# Patient Record
Sex: FEMALE | Race: WHITE | Employment: FULL TIME | URBAN - METROPOLITAN AREA
[De-identification: names, ages, dates, MRNs, and addresses within clinical notes are randomized per-mention and may not be internally consistent; named-entity substitution may affect disease eponyms.]

---

## 2019-03-20 ENCOUNTER — HOSPITAL ENCOUNTER (EMERGENCY)
Age: 24
Discharge: HOME | End: 2019-03-20
Attending: EMERGENCY MEDICINE
Payer: COMMERCIAL

## 2019-03-20 VITALS
TEMPERATURE: 98.6 F | HEART RATE: 77 BPM | DIASTOLIC BLOOD PRESSURE: 83 MMHG | OXYGEN SATURATION: 99 % | SYSTOLIC BLOOD PRESSURE: 120 MMHG | RESPIRATION RATE: 14 BRPM

## 2019-03-20 DIAGNOSIS — T14.8XXA ANIMAL BITE: ICD-10-CM

## 2019-03-20 DIAGNOSIS — S01.81XA FACIAL LACERATION, INITIAL ENCOUNTER: Primary | ICD-10-CM

## 2019-03-20 PROCEDURE — 77030018836 HC SOL IRR NACL ICUM -A

## 2019-03-20 PROCEDURE — 77030002888 HC SUT CHRMC J&J -A

## 2019-03-20 PROCEDURE — 75810000293 HC SIMP/SUPERF WND  RPR

## 2019-03-20 PROCEDURE — 74011000250 HC RX REV CODE- 250: Performed by: EMERGENCY MEDICINE

## 2019-03-20 PROCEDURE — 74011250637 HC RX REV CODE- 250/637: Performed by: EMERGENCY MEDICINE

## 2019-03-20 PROCEDURE — 99283 EMERGENCY DEPT VISIT LOW MDM: CPT

## 2019-03-20 RX ORDER — AMOXICILLIN AND CLAVULANATE POTASSIUM 875; 125 MG/1; MG/1
1 TABLET, FILM COATED ORAL
Status: COMPLETED | OUTPATIENT
Start: 2019-03-20 | End: 2019-03-20

## 2019-03-20 RX ORDER — AMOXICILLIN AND CLAVULANATE POTASSIUM 875; 125 MG/1; MG/1
1 TABLET, FILM COATED ORAL 2 TIMES DAILY
Qty: 14 TAB | Refills: 0 | Status: SHIPPED | OUTPATIENT
Start: 2019-03-20 | End: 2019-03-27

## 2019-03-20 RX ORDER — LIDOCAINE HYDROCHLORIDE AND EPINEPHRINE 10; 10 MG/ML; UG/ML
1.5 INJECTION, SOLUTION INFILTRATION; PERINEURAL ONCE
Status: DISCONTINUED | OUTPATIENT
Start: 2019-03-20 | End: 2019-03-20 | Stop reason: HOSPADM

## 2019-03-20 RX ADMIN — AMOXICILLIN AND CLAVULANATE POTASSIUM 1 TABLET: 875; 125 TABLET, FILM COATED ORAL at 17:42

## 2019-03-20 RX ADMIN — Medication 2 ML: at 16:51

## 2019-03-20 NOTE — ED TRIAGE NOTES
Patient presents from home with complaints of being scratched by a dog around 1430 this afternoon. Patient arrives with steri strip applied to face

## 2019-03-20 NOTE — DISCHARGE INSTRUCTIONS
Patient Education   Patient Education   SUTURES WILL DISSOLVE     Animal Bites: Care Instructions  Your Care Instructions  After an animal bite, the biggest concern is infection. The chance of infection depends on the type of animal that bit you, where on your body you were bitten, and your general health. Many animal bites are not closed with stitches, because this can increase the chance of infection. Your bite may take as little as 7 days or as long as several months to heal, depending on how bad it is. Taking good care of your wound at home will help it heal and reduce your chance of infection. The doctor has checked you carefully, but problems can develop later. If you notice any problems or new symptoms, get medical treatment right away. Follow-up care is a key part of your treatment and safety. Be sure to make and go to all appointments, and call your doctor if you are having problems. It's also a good idea to know your test results and keep a list of the medicines you take. How can you care for yourself at home? · If your doctor told you how to care for your wound, follow your doctor's instructions. If you did not get instructions, follow this general advice:  ? After 24 to 48 hours, gently wash the wound with clean water 2 times a day. Do not scrub or soak the wound. Don't use hydrogen peroxide or alcohol, which can slow healing. ? You may cover the wound with a thin layer of petroleum jelly, such as Vaseline, and a nonstick bandage. ? Apply more petroleum jelly and replace the bandage as needed. · After you shower, gently dry the wound with a clean towel. · If your doctor has closed the wound, cover the bandage with a plastic bag before you take a shower. · A small amount of skin redness and swelling around the wound edges and the stitches or staples is normal. Your wound may itch or feel irritated. Do not scratch or rub the wound.   · Ask your doctor if you can take an over-the-counter pain medicine, such as acetaminophen (Tylenol), ibuprofen (Advil, Motrin), or naproxen (Aleve). Read and follow all instructions on the label. · Do not take two or more pain medicines at the same time unless the doctor told you to. Many pain medicines have acetaminophen, which is Tylenol. Too much acetaminophen (Tylenol) can be harmful. · If your bite puts you at risk for rabies, you will get a series of shots over the next few weeks to prevent rabies. Your doctor will tell you when to get the shots. It is very important that you get the full cycle of shots. Follow your doctor's instructions exactly. · You may need a tetanus shot if you have not received one in the last 5 years. · If your doctor prescribed antibiotics, take them as directed. Do not stop taking them just because you feel better. You need to take the full course of antibiotics. When should you call for help? Call your doctor now or seek immediate medical care if:    · The skin near the bite turns cold or pale or it changes color.     · You lose feeling in the area near the bite, or it feels numb or tingly.     · You have trouble moving a limb near the bite.     · You have symptoms of infection, such as:  ? Increased pain, swelling, warmth, or redness near the wound. ? Red streaks leading from the wound. ? Pus draining from the wound. ? A fever.     · Blood soaks through the bandage. Oozing small amounts of blood is normal.     · Your pain is getting worse.    Watch closely for changes in your health, and be sure to contact your doctor if you are not getting better as expected. Where can you learn more? Go to http://andrés-felix.info/. Enter Y674 in the search box to learn more about \"Animal Bites: Care Instructions. \"  Current as of: September 23, 2018  Content Version: 11.9  © 3338-2046 [employer].  Care instructions adapted under license by [employer] (which disclaims liability or warranty for this information). If you have questions about a medical condition or this instruction, always ask your healthcare professional. [address] any warranty or liability for your use of this information. Cuts: Care Instructions  Your Care Instructions  A cut can happen anywhere on your body. Stitches, staples, skin adhesives, or pieces of tape called Steri-Strips are sometimes used to keep the edges of a cut together and help it heal. Steri-Strips can be used by themselves or with stitches or staples. Sometimes cuts are left open. If the cut went deep and through the skin, the doctor may have closed the cut in two layers. A deeper layer of stitches brings the deep part of the cut together. These stitches will dissolve and don't need to be removed. The upper layer closure, which could be stitches, staples, Steri-Strips, or adhesive, is what you see on the cut. A cut is often covered by a bandage. The doctor has checked you carefully, but problems can develop later. If you notice any problems or new symptoms, get medical treatment right away. Follow-up care is a key part of your treatment and safety. Be sure to make and go to all appointments, and call your doctor if you are having problems. It's also a good idea to know your test results and keep a list of the medicines you take. How can you care for yourself at home? If a cut is open or closed  · Prop up the sore area on a pillow anytime you sit or lie down during the next 3 days. Try to keep it above the level of your heart. This will help reduce swelling. · Keep the cut dry for the first 24 to 48 hours. After this, you can shower if your doctor okays it. Pat the cut dry. · Don't soak the cut, such as in a bathtub. Your doctor will tell you when it's safe to get the cut wet. · After the first 24 to 48 hours, clean the cut with soap and water 2 times a day unless your doctor gives you different instructions.   ? Don't use hydrogen peroxide or alcohol, which can slow healing. ? You may cover the cut with a thin layer of petroleum jelly and a nonstick bandage. ? If the doctor put a bandage over the cut, put on a new bandage after cleaning the cut or if the bandage gets wet or dirty. · Avoid any activity that could cause your cut to reopen. · Be safe with medicines. Read and follow all instructions on the label. ? If the doctor gave you a prescription medicine for pain, take it as prescribed. ? If you are not taking a prescription pain medicine, ask your doctor if you can take an over-the-counter medicine. If the cut is closed with stitches, staples, or Steri-Strips  · Follow the above instructions for open or closed cuts. · Do not remove the stitches or staples on your own. Your doctor will tell you when to come back to have the stitches or staples removed. · Leave Steri-Strips on until they fall off. If the cut is closed with a skin adhesive  · Follow the above instructions for open or closed cuts. · Leave the skin adhesive on your skin until it falls off on its own. This may take 5 to 10 days. · Do not scratch, rub, or pick at the adhesive. · Do not put the sticky part of a bandage directly on the adhesive. · Do not put any kind of ointment, cream, or lotion over the area. This can make the adhesive fall off too soon. Do not use hydrogen peroxide or alcohol, which can slow healing. When should you call for help? Call your doctor now or seek immediate medical care if:    · You have new pain, or your pain gets worse.     · The skin near the cut is cold or pale or changes color.     · You have tingling, weakness, or numbness near the cut.     · The cut starts to bleed, and blood soaks through the bandage. Oozing small amounts of blood is normal.     · You have trouble moving the area near the cut.     · You have symptoms of infection, such as:  ? Increased pain, swelling, warmth, or redness around the cut.  ?  Red streaks leading from the cut.  ? Pus draining from the cut.  ? A fever.    Watch closely for changes in your health, and be sure to contact your doctor if:    · The cut reopens.     · You do not get better as expected. Where can you learn more? Go to http://andrés-felix.info/. Enter M735 in the search box to learn more about \"Cuts: Care Instructions. \"  Current as of: September 23, 2018  Content Version: 11.9  © 3737-1292 [employer]. Care instructions adapted under license by [employer] (which disclaims liability or warranty for this information). If you have questions about a medical condition or this instruction, always ask your healthcare professional. [address] any warranty or liability for your use of this information. Patient Education        Cuts: Care Instructions  Your Care Instructions  A cut can happen anywhere on your body. Stitches, staples, skin adhesives, or pieces of tape called Steri-Strips are sometimes used to keep the edges of a cut together and help it heal. Steri-Strips can be used by themselves or with stitches or staples. Sometimes cuts are left open. If the cut went deep and through the skin, the doctor may have closed the cut in two layers. A deeper layer of stitches brings the deep part of the cut together. These stitches will dissolve and don't need to be removed. The upper layer closure, which could be stitches, staples, Steri-Strips, or adhesive, is what you see on the cut. A cut is often covered by a bandage. The doctor has checked you carefully, but problems can develop later. If you notice any problems or new symptoms, get medical treatment right away. Follow-up care is a key part of your treatment and safety. Be sure to make and go to all appointments, and call your doctor if you are having problems. It's also a good idea to know your test results and keep a list of the medicines you take.   How can you care for yourself at home? If a cut is open or closed  · Prop up the sore area on a pillow anytime you sit or lie down during the next 3 days. Try to keep it above the level of your heart. This will help reduce swelling. · Keep the cut dry for the first 24 to 48 hours. After this, you can shower if your doctor okays it. Pat the cut dry. · Don't soak the cut, such as in a bathtub. Your doctor will tell you when it's safe to get the cut wet. · After the first 24 to 48 hours, clean the cut with soap and water 2 times a day unless your doctor gives you different instructions. ? Don't use hydrogen peroxide or alcohol, which can slow healing. ? You may cover the cut with a thin layer of petroleum jelly and a nonstick bandage. ? If the doctor put a bandage over the cut, put on a new bandage after cleaning the cut or if the bandage gets wet or dirty. · Avoid any activity that could cause your cut to reopen. · Be safe with medicines. Read and follow all instructions on the label. ? If the doctor gave you a prescription medicine for pain, take it as prescribed. ? If you are not taking a prescription pain medicine, ask your doctor if you can take an over-the-counter medicine. If the cut is closed with stitches, staples, or Steri-Strips  · Follow the above instructions for open or closed cuts. · Do not remove the stitches or staples on your own. Your doctor will tell you when to come back to have the stitches or staples removed. · Leave Steri-Strips on until they fall off. If the cut is closed with a skin adhesive  · Follow the above instructions for open or closed cuts. · Leave the skin adhesive on your skin until it falls off on its own. This may take 5 to 10 days. · Do not scratch, rub, or pick at the adhesive. · Do not put the sticky part of a bandage directly on the adhesive. · Do not put any kind of ointment, cream, or lotion over the area. This can make the adhesive fall off too soon.  Do not use hydrogen peroxide or alcohol, which can slow healing. When should you call for help? Call your doctor now or seek immediate medical care if:    · You have new pain, or your pain gets worse.     · The skin near the cut is cold or pale or changes color.     · You have tingling, weakness, or numbness near the cut.     · The cut starts to bleed, and blood soaks through the bandage. Oozing small amounts of blood is normal.     · You have trouble moving the area near the cut.     · You have symptoms of infection, such as:  ? Increased pain, swelling, warmth, or redness around the cut.  ? Red streaks leading from the cut.  ? Pus draining from the cut.  ? A fever.    Watch closely for changes in your health, and be sure to contact your doctor if:    · The cut reopens.     · You do not get better as expected. Where can you learn more? Go to http://andrés-felix.info/. Enter M735 in the search box to learn more about \"Cuts: Care Instructions. \"  Current as of: September 23, 2018  Content Version: 11.9  © 7349-3351 [employer]. Care instructions adapted under license by [employer] (which disclaims liability or warranty for this information). If you have questions about a medical condition or this instruction, always ask your healthcare professional. [address] any warranty or liability for your use of this information.

## 2019-03-20 NOTE — ED PROVIDER NOTES
HPI Pt states that she was scratched/nipped at by her dog while attempting to put her in her crate. She sustained a laceration to the right facial cheek. Bleeding is controlled. There is no obvious soft tissue or bony deformity. Good neurovascular sensation. No apparent tendon or nerve injury. No apparent dental trauma. Past Medical History:  
Diagnosis Date  Asthma  Depression  Headache Past Surgical History:  
Procedure Laterality Date [address] Family History:  
Problem Relation Age of Onset  Migraines Mother  Hypertension Maternal Grandmother  Thyroid Disease Maternal Grandmother Social History Socioeconomic History  Marital status: SINGLE Spouse name: Not on file  Number of children: Not on file  Years of education: Not on file  Highest education level: Not on file Occupational History  Not on file Social Needs  Financial resource strain: Not on file  Food insecurity:  
  Worry: Not on file Inability: Not on file  Transportation needs:  
  Medical: Not on file Non-medical: Not on file Tobacco Use  Smoking status: Never Smoker  Smokeless tobacco: Never Used Substance and Sexual Activity  Alcohol use: No  
 Drug use: No  
 Sexual activity: Never Lifestyle  Physical activity:  
  Days per week: Not on file Minutes per session: Not on file  Stress: Not on file Relationships  Social connections:  
  Talks on phone: Not on file Gets together: Not on file Attends Rastafarian service: Not on file Active member of club or organization: Not on file Attends meetings of clubs or organizations: Not on file Relationship status: Not on file  Intimate partner violence:  
  Fear of current or ex partner: Not on file Emotionally abused: Not on file Physically abused: Not on file Forced sexual activity: Not on file Other Topics Concern  Not on file Social History Narrative 24year old single  female admitted voluntarily for CC: of SI after a breakup with a boyfriend. Pt. Was dx'd with depression by her pcp and started on Lexapro which was just changed to Effexor for non improvement of depression. She cut her arm priior to admission while feeling suicidal,. She has no prior inpatient psychiatric admissions and denies past suicide attempts. 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 Her parents are paying her tuition and she ford not work. She has no extracurrixular activities because she has no time after going to the gym 3 times a week. ALLERGIES: Patient has no known allergies. Review of Systems Constitutional: Negative for activity change, appetite change and fever. HENT: Negative for congestion and trouble swallowing. Respiratory: Negative for cough and shortness of breath. Cardiovascular: Negative for chest pain, palpitations and leg swelling. Gastrointestinal: Negative for abdominal distention, abdominal pain, nausea and vomiting. Genitourinary: Negative for dysuria. Musculoskeletal: Negative for arthralgias, gait problem and myalgias. Skin: Positive for wound. All other systems reviewed and are negative. Vitals:  
 03/20/19 1602 03/20/19 1640 BP:  120/83 Pulse: 85 77 Resp:  14 Temp:  98.6 °F (37 °C) SpO2: 99% 99% Physical Exam  
Constitutional: She is oriented to person, place, and time. She appears well-developed and well-nourished. White female; non smoker HENT:  
Right Ear: External ear normal.  
Left Ear: External ear normal.  
Nose: Nose normal.  
Mouth/Throat: Oropharynx is clear and moist.  
3cm dog bite right facial cheek ;bleeding is controlled There is no obvious soft tissue or bony deformity. Good neurovascular sensation. Neck: Normal range of motion. Neck supple. Cardiovascular: Normal rate and regular rhythm. Pulmonary/Chest: Effort normal and breath sounds normal.  
Abdominal: Soft. Bowel sounds are normal.  
Musculoskeletal: Normal range of motion. Lymphadenopathy:  
  She has no cervical adenopathy. Neurological: She is alert and oriented to person, place, and time. Skin: Skin is warm and dry. No rash noted. Psychiatric: She has a normal mood and affect. Nursing note and vitals reviewed. MDM Wound Repair 
Date/Time: 3/20/2019 6:16 PM 
Performed by: NPSupervising provider: Dr. Deep Bennett Preparation: skin prepped with Shur-Clens Pre-procedure re-eval: Immediately prior to the procedure, the patient was reevaluated and found suitable for the planned procedure and any planned medications. Location details: face (3cm right facial cheek dog bite) Wound length: 3cm. Anesthesia: 
Local Anesthetic: LET (lido,epi,tetracaine) Foreign bodies: no foreign bodies Irrigation solution: saline Irrigation method: syringe Debridement: minimal 
Skin closure: gut Number of sutures: 2 Technique: interrupted Approximation: close Patient tolerance: Patient tolerated the procedure well with no immediate complications My total time at bedside, performing this procedure was 16-30 minutes. Comments: Wound care and animal bite instructions were reviewed with the pt; good neurovascular sensation before and after the wound care. Estevan Miller NP Reviewed skin recommendations with  Marbin Granados. Follow up with your PCP improvement for further evaluation and wound check as needed. Use only unscented soaps and lotions. 6:19 PM 
Patient's results and plan of care have been reviewed with her and her mom.   Patient and/or family have verbally conveyed their understanding and agreement of the patient's signs, symptoms, diagnosis, treatment and prognosis and additionally agree to follow up as recommended or return to the Emergency Room should her condition change prior to follow-up. Discharge instructions have also been provided to the patient with some educational information regarding her diagnosis as well a list of reasons why she would want to return to the ER prior to her follow-up appointment should her condition change. Dilia Fuentes, NP

## 2019-10-16 ENCOUNTER — OFFICE VISIT (OUTPATIENT)
Dept: FAMILY MEDICINE CLINIC | Age: 24
End: 2019-10-16

## 2019-10-16 VITALS
TEMPERATURE: 98.6 F | WEIGHT: 158.8 LBS | HEIGHT: 67 IN | RESPIRATION RATE: 18 BRPM | DIASTOLIC BLOOD PRESSURE: 69 MMHG | BODY MASS INDEX: 24.92 KG/M2 | SYSTOLIC BLOOD PRESSURE: 103 MMHG | OXYGEN SATURATION: 98 % | HEART RATE: 74 BPM

## 2019-10-16 DIAGNOSIS — J06.9 VIRAL URI: ICD-10-CM

## 2019-10-16 DIAGNOSIS — R09.81 HEAD CONGESTION: ICD-10-CM

## 2019-10-16 DIAGNOSIS — R05.9 COUGH: Primary | ICD-10-CM

## 2019-10-16 LAB
FLUAV+FLUBV AG NOSE QL IA.RAPID: NEGATIVE POS/NEG
FLUAV+FLUBV AG NOSE QL IA.RAPID: NEGATIVE POS/NEG
VALID INTERNAL CONTROL?: YES

## 2019-10-16 RX ORDER — LEVOCETIRIZINE DIHYDROCHLORIDE 5 MG/1
5 TABLET, FILM COATED ORAL DAILY
Qty: 30 TAB | Refills: 0 | Status: SHIPPED | OUTPATIENT
Start: 2019-10-16 | End: 2019-11-15

## 2019-10-16 RX ORDER — NORGESTIMATE AND ETHINYL ESTRADIOL 0.25-0.035
KIT ORAL
COMMUNITY
Start: 2018-12-17 | End: 2020-09-11

## 2019-10-16 RX ORDER — VALACYCLOVIR HYDROCHLORIDE 500 MG/1
TABLET, FILM COATED ORAL
Refills: 0 | COMMUNITY
Start: 2019-09-19

## 2019-10-16 RX ORDER — TIZANIDINE 4 MG/1
TABLET ORAL
COMMUNITY
Start: 2019-01-16

## 2019-10-16 RX ORDER — FLUTICASONE PROPIONATE 50 MCG
2 SPRAY, SUSPENSION (ML) NASAL DAILY
Qty: 1 BOTTLE | Refills: 0 | Status: SHIPPED | OUTPATIENT
Start: 2019-10-16

## 2019-10-16 NOTE — PROGRESS NOTES
Christie Rodriguez is a 22 y.o. female  HIPAA verified by two patient identifiers. Chief Complaint   Patient presents with    Cough     Visit Vitals  /69 (BP 1 Location: Right arm, BP Patient Position: Sitting)   Pulse 74   Temp 98.6 °F (37 °C) (Oral)   Resp 18   Ht 5' 7\" (1.702 m)   Wt 158 lb 12.8 oz (72 kg)   LMP 10/09/2019   SpO2 98%   BMI 24.87 kg/m²       Pain Scale: 3/10  Pain Location: Head  1. Have you been to the ER, urgent care clinic since your last visit? Hospitalized since your last visit? NO    2. Have you seen or consulted any other health care providers outside of the [address] [place] [address] since your last visit? Include any pap smears or colon screening.  No

## 2019-10-16 NOTE — LETTER
NOTIFICATION RETURN TO WORK / SCHOOL 
 
10/16/2019 12:57 PM 
 
Ms. Linda Zhang [facility] [address] To Whom It May Concern: 
 
Linda Zhang is currently under the care of [address] [place] [address]. She will return to work/school on: 10/18/19 If there are questions or concerns please have the patient contact our office.  
 
 
 
Sincerely, 
 
 
Enrique Ryder NP

## 2019-10-16 NOTE — PATIENT INSTRUCTIONS

## 2019-10-21 NOTE — PROGRESS NOTES
Subjective:   Kyle Willson is a 22 y.o. female who complains of coryza, congestion and dry cough for 4 days, gradually worsening since that time. She denies a history of shortness of breath and wheezing. Evaluation to date: none. Treatment to date: none, OTC products. Patient does not smoke cigarettes. Relevant PMH: No pertinent additional PMH. Patient Active Problem List   Diagnosis Code    Depression F32.9     Patient Active Problem List    Diagnosis Date Noted    Depression 12/30/2015     Current Outpatient Medications   Medication Sig Dispense Refill    norgestimate-ethinyl estradiol (ORTHO-CYCLEN, SPRINTEC) 0.25-35 mg-mcg tab Take  by mouth.  valACYclovir (VALTREX) 500 mg tablet   0    tiZANidine (ZANAFLEX) 4 mg tablet Take  by mouth.  levocetirizine (XYZAL) 5 mg tablet Take 1 Tab by mouth daily for 30 days. 30 Tab 0    fluticasone propionate (FLONASE) 50 mcg/actuation nasal spray 2 Sprays by Both Nostrils route daily. 1 Bottle 0    venlafaxine-SR (EFFEXOR-XR) 150 mg capsule Take 1 Cap by mouth daily. 30 Cap 3    traZODone (DESYREL) 50 mg tablet Please take 1 tab as needed. Indications: MAJOR DEPRESSIVE DISORDER 30 Tab 3    PROAIR HFA 90 mcg/actuation inhaler Take 2 Puffs by inhalation every four (4) hours as needed.  diclofenac EC (VOLTAREN) 75 mg EC tablet Take 75 mg by mouth.  cyclobenzaprine (FLEXERIL) 5 mg tablet Take 5 mg by mouth three (3) times daily as needed.  stjsrmw-fsmxtdqti-hvhflwuouldf (MIDRIN) -325 mg capsule Take 1-2 Caps by mouth as needed for Migraine. (Take 2 capsules at start of headache; repeat 1 capsule every 1 hour as needed. Maximum of 5 capsules per day). Indications: MIGRAINE      norethindrone-ethinyl estradiol-iron (MICROGESTIN FE1.5/30) 1.5 mg-30 mcg (21)/75 mg (7) tablet Take 1 Tab by mouth daily.  Indications: PREGNANCY CONTRACEPTION       No Known Allergies  Past Medical History:   Diagnosis Date    Asthma     Depression     Headache      Past Surgical History:   Procedure Laterality Date    KNEE ARTHROSCP HARV       Family History   Problem Relation Age of Onset   [facility] Migraines Mother     Hypertension Maternal Grandmother     Thyroid Disease Maternal Grandmother      Social History     Tobacco Use    Smoking status: Never Smoker    Smokeless tobacco: Never Used   Substance Use Topics    Alcohol use: No        Review of Systems  Pertinent items are noted in HPI. Objective:     Visit Vitals  /69 (BP 1 Location: Right arm, BP Patient Position: Sitting)   Pulse 74   Temp 98.6 °F (37 °C) (Oral)   Resp 18   Ht 5' 7\" (1.702 m)   Wt 158 lb 12.8 oz (72 kg)   LMP 10/09/2019   SpO2 98%   BMI 24.87 kg/m²     General:  alert, cooperative, no distress   Eyes: conjunctivae/corneas clear. PERRL, EOM's intact. Fundi benign   Ears: normal TM's and external ear canals AU   Sinuses: Normal paranasal sinuses without tenderness   Mouth:  Lips, mucosa, and tongue normal. Teeth and gums normal   Neck: supple, symmetrical, trachea midline and no adenopathy. Heart: S1 and S2 normal, no murmurs noted. Lungs: clear to auscultation bilaterally   Abdomen: soft, non-tender. Bowel sounds normal. No masses,  no organomegaly        Assessment/Plan:   viral upper respiratory illness  Suggested symptomatic OTC remedies. RTC prn. Discussed diagnosis and treatment of viral URIs. Discussed the importance of avoiding unnecessary antibiotic therapy. ICD-10-CM ICD-9-CM    1. Cough R05 786.2 AMB POC KAYLA INFLUENZA A/B TEST   2. Viral URI J06.9 465.9    3. Head congestion R09.81 478.19 levocetirizine (XYZAL) 5 mg tablet      fluticasone propionate (FLONASE) 50 mcg/actuation nasal spray     reviewed medications and side effects in detail.     Visit Vitals  /69 (BP 1 Location: Right arm, BP Patient Position: Sitting)   Pulse 74   Temp 98.6 °F (37 °C) (Oral)   Resp 18   Ht 5' 7\" (1.702 m)   Wt 158 lb 12.8 oz (72 kg)   LMP 10/09/2019 SpO2 98%   BMI 24.87 kg/m²     Spoke with the patient regarding their blood pressure (BP) reading at today's visit. The patient verbalized understanding of need to maintain BP lower than 140/90. The patient will follow up with their primary care physician regarding management and/or medications that may be needed. Drepssion Screening has been completed. The patient isdenies having a history of depression. The patient is nottaking medication and is being followed by their PCP at this time. This patient does  have a primary care physician. Referral was not given a referral at todays visit. Immunizations: The patient is current on their influenza immunization at this time. The patient does not   want to receive the influenza immunization today. Follow up instructions given at today's visit were verbalized by the patient/parent. The signs of infection are fever > 100.4, increase fatigue, change in mental status, or decrease in urinary output. The patient/parent verbalized understanding of taking medications prescribed during this visit as prescribed.

## 2020-04-15 ENCOUNTER — NURSE TRIAGE (OUTPATIENT)
Dept: OTHER | Facility: CLINIC | Age: 25
End: 2020-04-15

## 2020-04-15 NOTE — TELEPHONE ENCOUNTER
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 States pain resolved but has since returned. Rates pain 5/10 and states pain is intermittent and worsens with movement. Patient informed of disposition. Emailed patient employee injury packet. Care advice as documented. Instructed patient to call back with worsening symptoms. Patient verbalized understanding and denies any further questions/concerns. Please do not respond to the triage nurse through this encounter. Any subsequent communication should be directly with the patient.      Reason for Disposition   Patient wants to be seen    Protocols used: BACK INJURY-ADULT-[place]

## 2020-05-05 ENCOUNTER — HOSPITAL ENCOUNTER (OUTPATIENT)
Dept: PHYSICAL THERAPY | Age: 25
Discharge: HOME | End: 2020-05-05
Payer: OTHER MISCELLANEOUS

## 2020-05-05 PROCEDURE — 97014 ELECTRIC STIMULATION THERAPY: CPT

## 2020-05-05 PROCEDURE — 97161 PT EVAL LOW COMPLEX 20 MIN: CPT

## 2020-05-05 PROCEDURE — 97110 THERAPEUTIC EXERCISES: CPT

## 2020-05-05 NOTE — PROGRESS NOTES
[facility]  [id number] [address], [address] [place] [address]  Phone: [phone]  Fax: [phone]    Plan of Care/Statement of Necessity for Physical Therapy Services  2-15    Patient name: Lauren Francis  : 1995  Provider#: [id number]  Referral source: Zahira Tobias DO      Medical/Treatment Diagnosis: Low back pain [M54.5]     Prior Hospitalization: see medical history     Comorbidities: none  Prior Level of Function: Patient completed 20 minutes of exercise seldom or never. Medications: Verified on Patient Summary List    Start of Care: 20      Onset Date: 3/30/20       The Plan of Care and following information is based on the information from the initial evaluation. 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 Pt reports primary complaints of intermittent sharp central and bilateral low back pain. Based on examination, patient presents with symptoms consistent with strains of lumbosacral ligaments and paraspinal musculature, and would benefit from PT to reduce pain, improve function, and return to work without restrictions. Evaluation Complexity History LOW Complexity : Zero comorbidities / personal factors that will impact the outcome / POC; Examination MEDIUM Complexity : 3 Standardized tests and measures addressing body structure, function, activity limitation and / or participation in recreation  ;Presentation LOW Complexity : Stable, uncomplicated  ;   Overall Complexity Rating: LOW     Problem List: pain affecting function, decrease ROM, decrease strength, decrease ADL/ functional abilitiies, decrease activity tolerance, decrease flexibility/ joint mobility and decrease transfer abilities   Treatment Plan may include any combination of the following: Therapeutic exercise, Therapeutic activities, Neuromuscular re-education, Physical agent/modality, Manual therapy, Patient education, Self Care training and Functional mobility training  Patient / Family readiness to learn indicated by: asking questions, trying to perform skills and interest  Persons(s) to be included in education: patient (P)  Barriers to Learning/Limitations: None  Patient Goal (s): Pain management  Patient Self Reported Health Status: good  Rehabilitation Potential: excellent    Short Term Goals: To be accomplished in 8 treatments:   1. Pt will be independent and compliant with HEP. 2. Pt will demonstrate >/= 75% lumbar AROM before onset of increased pain. Long Term Goals: To be accomplished in 16 treatments:   1. Pt will be independent and compliant with HEP. 2. Pt will improve FOTO score by the MCID demonstrating improved overall function with decreased pain or discomfort. 3. Pt will demonstrate 100% lumbar AROM without complaints of back pain. 4. Pt will be able to tolerate prolonged standing >/= 30 minutes without complaints of back pain. 5. Pt will be able to tolerate prolonged sitting >/= 30 minutes without complaints of back pain. 6. 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 Frequency / Duration: Patient to be seen 1-2 times per week for 16 treatments. Patient/ Caregiver education and instruction: self care, activity modification and exercises    [x]  Plan of care has been reviewed with DEEPAK Fox PT, DPT 5/5/2020     ________________________________________________________________________    I certify that the above Therapy Services are being furnished while the patient is under my care. I agree with the treatment plan and certify that this therapy is necessary.     [de-identified] Signature:____________________  Date:____________Time: _________

## 2020-05-05 NOTE — PROGRESS NOTES
PT INITIAL EVALUATION NOTE - [facility] 2-15    Patient Name: Jocelyne Reyes  Date:2020  : 1995  [x]  Patient  Verified  Payor: Crys Miguel / Plan: [id number] / Product Type: Workers Comp /    In time:10:14 am  Out time:11:18 am  Total Treatment Time (min): 64  Total Timed Codes (min): 15  1:1 Treatment Time ( only): 15   Visit #: 1     Treatment Area: Low back pain [M54.5]    SUBJECTIVE  Pain Level (0-10 scale): 4/10  Any medication changes, allergies to medications, adverse drug reactions, diagnosis change, or new procedure performed?: [] No    [x] Yes (see summary sheet for update)  Subjective:    Pt was referred to skilled PT for a low back strain. Today, Pt reports primary complaints of intermittent central and bilateral low back pain. Pt denies any numbness/tingling. 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 This caused immediate back pain which temporarily improved before returning a week or so later. Over the past few weeks, symptoms have been about the same overall and are intermittent. Pt does note that 2 weeks ago, she bent over to  her dogs leash which really aggravated her symptomatic back pain. No history of back pain. 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 Aggravating factors include prolonged sitting, prolonged standing, lying on sides, lying supine  Relieving factors include heat, knees to chest.    PLOF: History of cheering in high school without back issues; yard work, moving furniture.   Previous Treatment/Compliance: none   Work Hx: Pt is a rehab tech; wants to go to [employer]   PMHx/Surgical Hx: Right knee surgery meniscus repair; still pain and popping within knee    X-rays: Negative    Pt Goals: pain management; muscle strengthening     OBJECTIVE    Posture:  Normal  Other Observations:  --  Gait and Functional Mobility:  Normal; R knee pain   Squat: Normal, R knee pain  Bed mobility: log rolling; pain changing positions  Palpation: TTP L5-S2 central spinous processes and R>L transverse processes; TTP bilateral lumbosacral paraspinal musculature        Lumbar AROM:          R  L    Flexion    50-75% sharp pain across bilateral low back       Extension   50% sharp central pain      Side Bending   75% pain left  50% pain left    Rotation   75%   75%            Flexibility: Good hamstring flexibility bilaterally, no pain       MMT: pain with resisted hip abduction in s/l  Neurological: Reflexes / Sensations: NT  Special Tests:    Trendelenberg: (-)    FABERS: (-)   Forward Bend: (+)    Slump: (-)   H.S. SLR: (-)     Piriformis Ext: (-)      Modality rationale: decrease pain and increase tissue extensibility to improve the patients ability to return to work with decreased pain.    Min Type Additional Details   10 [x] Estim: []Att   [x]Unatt        []TENS instruct                  [x]IFC  []Premod   []NMES                     []Other:  []w/US   []w/ice   [x]w/heat  Position: Supine  Location: Lumbosacral region    []  Traction: [] Cervical       []Lumbar                       [] Prone          []Supine                       []Intermittent   []Continuous Lbs:  [] before manual  [] after manual  []w/heat    []  Ultrasound: []Continuous   [] Pulsed at:                           []1MHz   []3MHz Location:  W/cm2:    [] Paraffin         Location:   []w/heat    []  Ice     []  Heat  []  Ice massage Position:  Location:    []  Laser  []  Other: Position:  Location:      []  Vasopneumatic Device Pressure:       [] lo [] med [] hi   Temperature:      [x] Skin assessment post-treatment:  [x]intact []redness- no adverse reaction    []redness  adverse reaction:     15 min Therapeutic Exercise:  [] See flow sheet :   Rationale: increase ROM and increase proprioception to improve the patients ability to return to work and activity with decreased pain/restriction.     With   [] TE   [] TA   [] neuro   [] other: Patient Education: [x] Review HEP    [] Progressed/Changed HEP based on:   [] positioning   [] body mechanics   [] transfers   [] heat/ice application    [] other:      Other Objective/Functional Measures: FOTO score: e-mail sent    Pain Level (0-10 scale) post treatment: 2/10    ASSESSMENT/Changes in Function:     [x]  See Plan of [address], PT, DPT 5/5/2020

## 2020-05-12 ENCOUNTER — APPOINTMENT (OUTPATIENT)
Dept: PHYSICAL THERAPY | Age: 25
End: 2020-05-12
Payer: OTHER MISCELLANEOUS

## 2020-05-14 ENCOUNTER — HOSPITAL ENCOUNTER (OUTPATIENT)
Dept: PHYSICAL THERAPY | Age: 25
Discharge: HOME | End: 2020-05-14
Payer: OTHER MISCELLANEOUS

## 2020-05-14 PROCEDURE — 97110 THERAPEUTIC EXERCISES: CPT

## 2020-05-14 PROCEDURE — 97014 ELECTRIC STIMULATION THERAPY: CPT

## 2020-05-14 NOTE — PROGRESS NOTES
PT DAILY TREATMENT NOTE 2-15    Patient Name: Rhonda Tineo  Date:2020  : 1995  [x]  Patient  Verified  Payor: De Dears / Plan: [id number] / Product Type: Workers Comp /    In time:8:08 am  Out time:9:14 am  Total Treatment Time (min): 64  Visit #:  2    Treatment Area: Low back pain [M54.5]    SUBJECTIVE  Pain Level (0-10 scale): 2/10  Any medication changes, allergies to medications, adverse drug reactions, diagnosis change, or new procedure performed?: [x] No    [] Yes (see summary sheet for update)  Subjective functional status/changes:   [] No changes reported  Pt reports her back feels about the same overall; still localized intermittent pain. She has been diligent with her HEP and notes some discomfort at end of program, but heat relieves this pain. She had appt with MD yesterday who extended light duty restrictions at work for another 2 weeks. OBJECTIVE    Modality rationale: decrease pain and increase tissue extensibility to improve the patients ability to return to work with decreased pain. Min Type Additional Details   10 [x]? Estim: []? Att   [x]? Unatt        []? TENS instruct                  [x]?IFC  []? Premod   []? NMES                     []?Other:  []?w/US   []?w/ice   [x]?w/heat  Position: Supine  Location: Lumbosacral region     []? Traction: []? Cervical       []? Lumbar                       []? Prone          []? Supine                       []?Intermittent   []? Continuous Lbs:  []? before manual  []? after manual  []?w/heat     []? Ultrasound: []? Continuous   []? Pulsed at:                           []? 1MHz   []? 3MHz Location:  W/cm2:     []? Paraffin         Location:   []?w/heat     []? Ice     []? Heat  []? Ice massage Position:  Location:     []? Laser  []? Other: Position:  Location:        []? Vasopneumatic Device Pressure:       []? lo []? med []? hi   Temperature:       [x]?  Skin assessment post-treatment: [x]?intact []? redness- no adverse reaction    []? redness  adverse reaction:     54 min Therapeutic Exercise:  [] See flow sheet :   Rationale: increase ROM, increase strength and increase proprioception to improve the patients ability to return to PLOF with decreased pain or discomfort. With   [x] TE   [] TA   [] neuro   [] other: Patient Education: [x] Review HEP    [] Progressed/Changed HEP based on:   [] positioning   [] body mechanics   [] transfers   [] heat/ice application    [] other:      Other Objective/Functional Measures: --     Pain Level (0-10 scale) post treatment: 1/10    ASSESSMENT/Changes in Function:   Pt noted some discomfort with return to starting position from left LTR, though tolerable. She also noted mild pain/stiffness with open books bilaterally. Emphasized light activation and strengthening of core, lumbar and hip strengthening during today's session to improve stability and decrease pain; Pt tolerated well with cuing for proper form and minimized compensations. Patient will continue to benefit from skilled PT services to modify and progress therapeutic interventions, address functional mobility deficits, address ROM deficits, address strength deficits, analyze and address soft tissue restrictions, analyze and cue movement patterns, analyze and modify body mechanics/ergonomics and assess and modify postural abnormalities to attain remaining goals. [x]  See Plan of Care  []  See progress note/recertification  []  See Discharge Summary         Progress towards goals / Updated goals:  Short Term Goals: To be accomplished in 8 treatments:               1. Pt will be independent and compliant with HEP. 2. Pt will demonstrate >/= 75% lumbar AROM before onset of increased pain. Long Term Goals: To be accomplished in 16 treatments:               1. Pt will be independent and compliant with HEP.                2. Pt will improve FOTO score by the MCID demonstrating improved overall function with decreased pain or discomfort. 3. Pt will demonstrate 100% lumbar AROM without complaints of back pain. 4. Pt will be able to tolerate prolonged standing >/= 30 minutes without complaints of back pain. 5. Pt will be able to tolerate prolonged sitting >/= 30 minutes without complaints of back pain. 6. 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     PLAN  [x]  Upgrade activities as tolerated     [x]  Continue plan of care  []  Update interventions per flow sheet       []  Discharge due to:_  []  Other:_      Evie Tovar, PT, DPT 5/14/2020

## 2020-05-19 ENCOUNTER — HOSPITAL ENCOUNTER (OUTPATIENT)
Dept: PHYSICAL THERAPY | Age: 25
Discharge: HOME | End: 2020-05-19
Payer: OTHER MISCELLANEOUS

## 2020-05-19 PROCEDURE — 97530 THERAPEUTIC ACTIVITIES: CPT

## 2020-05-19 PROCEDURE — 97014 ELECTRIC STIMULATION THERAPY: CPT

## 2020-05-19 PROCEDURE — 97110 THERAPEUTIC EXERCISES: CPT

## 2020-05-19 NOTE — PROGRESS NOTES
PT DAILY TREATMENT NOTE 2-15    Patient Name: Devyn David  Date:2020  : 1995  [x]  Patient  Verified  Payor: Devi Kinsey / Plan: [id number] / Product Type: Workers Comp /    In time:8:06 am  Out time: 9:16 am  Total Treatment Time (min): 70  Visit #:  3    Treatment Area: Low back pain [M54.5]    SUBJECTIVE  Pain Level (0-10 scale): 0/10  Any medication changes, allergies to medications, adverse drug reactions, diagnosis change, or new procedure performed?: [x] No    [] Yes (see summary sheet for update)  Subjective functional status/changes:   [] No changes reported  Pt reports she has been feeling better over this past week. She is only experiencing mild soreness in low back at the end of her work days, but otherwise no significant pain. She feels like she will be ready to return to full duty at work sooner than 2 weeks. OBJECTIVE           Modality rationale: decrease pain and increase tissue extensibility to improve the patients ability to return to work with decreased pain. Min Type Additional Details   10 [x]? Estim: []? Att   [x]? Unatt        []? TENS instruct                  [x]?IFC  []? Premod   []? NMES                     []?Other:  []?w/US   []?w/ice   [x]?w/heat  Position: Supine  Location: Lumbosacral region     []? Traction: []? Cervical       []? Lumbar                       []? Prone          []? Supine                       []?Intermittent   []? Continuous Lbs:  []? before manual  []? after manual  []?w/heat     []? Ultrasound: []? Continuous   []? Pulsed at:                           []? 1MHz   []? 3MHz Location:  W/cm2:     []? Paraffin         Location:   []?w/heat     []? Ice     []? Heat  []? Ice massage Position:  Location:     []? Laser  []? Other: Position:  Location:        []? Vasopneumatic Device Pressure:       []? lo []? med []? hi   Temperature:       [x]? Skin assessment post-treatment:  [x]? intact []? redness- no adverse reaction    []? redness  adverse reaction:     50 min Therapeutic Exercise:  [] See flow sheet :   Rationale: increase ROM, increase strength and increase proprioception to improve the patients ability to return to PLOF with decreased pain or discomfort. 10 min Therapeutic Activity:  []  See flow sheet : Lifting and patient transfer mechanics utilizing appropriate positioning and weight shifting   Rationale: improve coordination and increase proprioception  to improve the patients ability to perform job duties with proper mechanics and minimized risk of future injury. With   [x] TE   [] TA   [] neuro   [] other: Patient Education: [x] Review HEP    [] Progressed/Changed HEP based on:   [] positioning   [] body mechanics   [] transfers   [] heat/ice application    [] other:      Other Objective/Functional Measures: --     Pain Level (0-10 scale) post treatment: 0/10    ASSESSMENT/Changes in Function:   No issue and full range with LTRs today. Pt also demonstrated significantly improved rotational ROM with open books today, with mild discomfort at end-range, but \"not pain. \"  Introduced and practiced proper lifting mechanics and patient transfer mechanics from supine to sit and sit to stand utilizing appropriate positioning and weight-shifting to decrease stress on back as patient returns to work; also cued for PPT/core engagement and patient able to perform without pain and demonstrated/verbalized understanding. Pt requested therapist report she can return to full-duty work next week; will call [employer] to inform and continue PT to monitor response.   Patient will continue to benefit from skilled PT services to modify and progress therapeutic interventions, address functional mobility deficits, address ROM deficits, address strength deficits, analyze and address soft tissue restrictions, analyze and cue movement patterns, analyze and modify body mechanics/ergonomics and assess and modify postural abnormalities to attain remaining goals. [x]  See Plan of Care  []  See progress note/recertification  []  See Discharge Summary         Progress towards goals / Updated goals:  Short Term Goals: To be accomplished in 8 treatments:               1. Pt will be independent and compliant with HEP. 2. Pt will demonstrate >/= 75% lumbar AROM before onset of increased pain. Long Term Goals: To be accomplished in 16 treatments:               1. Pt will be independent and compliant with HEP. 2. Pt will improve FOTO score by the MCID demonstrating improved overall function with decreased pain or discomfort. 3. Pt will demonstrate 100% lumbar AROM without complaints of back pain. 4. Pt will be able to tolerate prolonged standing >/= 30 minutes without complaints of back pain. 5. Pt will be able to tolerate prolonged sitting >/= 30 minutes without complaints of back pain. 6. 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     PLAN  [x]  Upgrade activities as tolerated     [x]  Continue plan of care  []  Update interventions per flow sheet       []  Discharge due to:_  []  Other:_      Aleksandr Herrmann, PT, DPT 5/19/2020

## 2020-05-26 ENCOUNTER — HOSPITAL ENCOUNTER (OUTPATIENT)
Dept: PHYSICAL THERAPY | Age: 25
Discharge: HOME | End: 2020-05-26
Payer: OTHER MISCELLANEOUS

## 2020-05-26 PROCEDURE — 97530 THERAPEUTIC ACTIVITIES: CPT

## 2020-05-26 PROCEDURE — 97014 ELECTRIC STIMULATION THERAPY: CPT

## 2020-05-26 PROCEDURE — 97110 THERAPEUTIC EXERCISES: CPT

## 2020-05-26 NOTE — PROGRESS NOTES
PT DAILY TREATMENT NOTE 2-15    Patient Name: Baljit Wood  Date:2020  : 1995  [x]  Patient  Verified  Payor: Cassidy Meyer / Plan: [id number] / Product Type: Workers Comp /    In time: 8:10 am  Out time: 9:02 am  Total Treatment Time (min): 52  Visit #:  4    Treatment Area: Low back pain [M54.5]    SUBJECTIVE  Pain Level (0-10 scale): 1/10  Any medication changes, allergies to medications, adverse drug reactions, diagnosis change, or new procedure performed?: [x] No    [] Yes (see summary sheet for update)  Subjective functional status/changes:   [] No changes reported  Pt reports she has been doing very well and has not had any flare-ups or pain in this past week. She has very mild discomfort this morning, but thinks it is because of how she slept. She returns to full-duty at work today. OBJECTIVE           Modality rationale: decrease pain and increase tissue extensibility to improve the patients ability to return to work with decreased pain. Min Type Additional Details   10 [x]? Estim: []? Att   [x]? Unatt        []? TENS instruct                  [x]?IFC  []? Premod   []? NMES                     []?Other:  []?w/US   []?w/ice   [x]?w/heat  Position: Supine  Location: Lumbosacral region     []? Traction: []? Cervical       []? Lumbar                       []? Prone          []? Supine                       []?Intermittent   []? Continuous Lbs:  []? before manual  []? after manual  []?w/heat     []? Ultrasound: []? Continuous   []? Pulsed at:                           []? 1MHz   []? 3MHz Location:  W/cm2:     []? Paraffin         Location:   []?w/heat     []? Ice     []? Heat  []? Ice massage Position:  Location:     []? Laser  []? Other: Position:  Location:        []? Vasopneumatic Device Pressure:       []? lo []? med []? hi   Temperature:       [x]? Skin assessment post-treatment:  [x]? intact []? redness- no adverse reaction    []? redness  adverse reaction:     33 min Therapeutic Exercise:  [] See flow sheet :   Rationale: increase ROM, increase strength and increase proprioception to improve the patients ability to return to PLOF with decreased pain or discomfort. 9 min Therapeutic Activity:  []  See flow sheet : Lifting and patient transfer mechanics utilizing appropriate positioning and weight shifting   Rationale: improve coordination and increase proprioception  to improve the patients ability to perform job duties with proper mechanics and minimized risk of future injury. With   [x] TE   [] TA   [] neuro   [] other: Patient Education: [x] Review HEP    [] Progressed/Changed HEP based on:   [] positioning   [] body mechanics   [] transfers   [] heat/ice application    [] other:      Other Objective/Functional Measures: --     Pain Level (0-10 scale) post treatment: 0/10    ASSESSMENT/Changes in Function:   Added bird-dogs to challenge core and lumbar region; provided verbal and tactile cuing to minimize rotational and lordotic compensations and Pt able to correct. Reviewed transfer mechanics to prepare patient for return to work and minimize risk of injury cuing for positioning, weight transfer, and posterior pelvic tilt. Scheduling an additional session next week due to return to work this week; Pt will call at end of week to cancel next session if no set-backs or pain. Patient will continue to benefit from skilled PT services to modify and progress therapeutic interventions, address functional mobility deficits, address ROM deficits, address strength deficits, analyze and address soft tissue restrictions, analyze and cue movement patterns, analyze and modify body mechanics/ergonomics and assess and modify postural abnormalities to attain remaining goals. [x]  See Plan of Care  []  See progress note/recertification  []  See Discharge Summary         Progress towards goals / Updated goals:  Short Term Goals:  To be accomplished in 8 treatments:               1. Pt will be independent and compliant with HEP. 2. Pt will demonstrate >/= 75% lumbar AROM before onset of increased pain. Long Term Goals: To be accomplished in 16 treatments:               1. Pt will be independent and compliant with HEP. 2. Pt will improve FOTO score by the MCID demonstrating improved overall function with decreased pain or discomfort. 3. Pt will demonstrate 100% lumbar AROM without complaints of back pain. 4. Pt will be able to tolerate prolonged standing >/= 30 minutes without complaints of back pain. 5. Pt will be able to tolerate prolonged sitting >/= 30 minutes without complaints of back pain. 6. 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     PLAN  [x]  Upgrade activities as tolerated     [x]  Continue plan of care  []  Update interventions per flow sheet       []  Discharge due to:_  []  Other:_      Fauzia Decker, PT, DPT 5/26/2020

## 2020-06-02 ENCOUNTER — HOSPITAL ENCOUNTER (OUTPATIENT)
Dept: PHYSICAL THERAPY | Age: 25
Discharge: HOME | End: 2020-06-02
Payer: OTHER MISCELLANEOUS

## 2020-06-02 PROCEDURE — 97110 THERAPEUTIC EXERCISES: CPT

## 2020-06-02 PROCEDURE — 97014 ELECTRIC STIMULATION THERAPY: CPT

## 2020-06-02 NOTE — PROGRESS NOTES
PT DAILY TREATMENT NOTE 2-15    Patient Name: Rhonda Tineo  Date:2020  : 1995  [x]  Patient  Verified  Payor: De Dears / Plan: [id number] / Product Type: Workers Comp /    In time: 8:10 am  Out time: 9:07 am  Total Treatment Time (min): 57  Visit #:  5    Treatment Area: Low back pain [M54.5]    SUBJECTIVE  Pain Level (0-10 scale): 1/10  Any medication changes, allergies to medications, adverse drug reactions, diagnosis change, or new procedure performed?: [x] No    [] Yes (see summary sheet for update)  Subjective functional status/changes:   [] No changes reported  Pt reports she has had some mild soreness in central/left lower back in her first week back to full-duty work. She has been more aware of her mechanics with job duties which has been helpful. She went hiking this weekend and tolerated the activity well. OBJECTIVE           Modality rationale: decrease pain and increase tissue extensibility to improve the patients ability to return to work with decreased pain. Min Type Additional Details   10 [x]? Estim: []? Att   [x]? Unatt        []? TENS instruct                  [x]?IFC  []? Premod   []? NMES                     []?Other:  []?w/US   []?w/ice   [x]?w/heat  Position: Supine  Location: Lumbosacral region     []? Traction: []? Cervical       []? Lumbar                       []? Prone          []? Supine                       []?Intermittent   []? Continuous Lbs:  []? before manual  []? after manual  []?w/heat     []? Ultrasound: []? Continuous   []? Pulsed at:                           []? 1MHz   []? 3MHz Location:  W/cm2:     []? Paraffin         Location:   []?w/heat     []? Ice     []? Heat  []? Ice massage Position:  Location:     []? Laser  []? Other: Position:  Location:        []? Vasopneumatic Device Pressure:       []? lo []? med []? hi   Temperature:       [x]? Skin assessment post-treatment:  [x]? intact []? redness- no adverse reaction    []? redness  adverse reaction:     47 min Therapeutic Exercise:  [] See flow sheet :   Rationale: increase ROM, increase strength and increase proprioception to improve the patients ability to return to PLOF with decreased pain or discomfort. With   [x] TE   [] TA   [] neuro   [] other: Patient Education: [x] Review HEP    [] Progressed/Changed HEP based on:   [] positioning   [] body mechanics   [] transfers   [] heat/ice application    [] other:      Other Objective/Functional Measures: --     Pain Level (0-10 scale) post treatment: 0/10    ASSESSMENT/Changes in Function:   Introduced RDLs with dumbbells today to promote improved lumbar stability and strength; provided verbal and visual cuing for proper technique; Pt noted mild, but tolerable soreness with exercise. Cued for PPT with bird-dogs to decrease lumbar lordosis and compensations with exercise; Pt able to correct and perform without pain. Patient will continue to benefit from skilled PT services to modify and progress therapeutic interventions, address functional mobility deficits, address ROM deficits, address strength deficits, analyze and address soft tissue restrictions, analyze and cue movement patterns, analyze and modify body mechanics/ergonomics and assess and modify postural abnormalities to attain remaining goals. [x]  See Plan of Care  []  See progress note/recertification  []  See Discharge Summary         Progress towards goals / Updated goals:  Short Term Goals: To be accomplished in 8 treatments:               1. Pt will be independent and compliant with HEP. 2. Pt will demonstrate >/= 75% lumbar AROM before onset of increased pain. Long Term Goals: To be accomplished in 16 treatments:               1. Pt will be independent and compliant with HEP. 2. Pt will improve FOTO score by the MCID demonstrating improved overall function with decreased pain or discomfort.                 3. Pt will demonstrate 100% lumbar AROM without complaints of back pain. 4. Pt will be able to tolerate prolonged standing >/= 30 minutes without complaints of back pain. 5. Pt will be able to tolerate prolonged sitting >/= 30 minutes without complaints of back pain. 6. 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     PLAN  [x]  Upgrade activities as tolerated     [x]  Continue plan of care  []  Update interventions per flow sheet       []  Discharge due to:_  []  Other:_      Rhett iMlian, PT, DPT 6/2/2020

## 2020-06-09 ENCOUNTER — HOSPITAL ENCOUNTER (OUTPATIENT)
Dept: PHYSICAL THERAPY | Age: 25
Discharge: HOME | End: 2020-06-09
Payer: OTHER MISCELLANEOUS

## 2020-06-09 PROCEDURE — 97110 THERAPEUTIC EXERCISES: CPT

## 2020-06-09 NOTE — PROGRESS NOTES
PT DAILY TREATMENT NOTE 2-15    Patient Name: Jose Cabrera  Date:2020  : 1995  [x]  Patient  Verified  Payor: Deisy Beltran / Plan: [id number] / Product Type: Workers Comp /    In time: 8:09 am  Out time: 9:00 am  Total Treatment Time (min): 51  Visit #:  6    Treatment Area: Low back pain [M54.5]    SUBJECTIVE  Pain Level (0-10 scale): 0/10  Any medication changes, allergies to medications, adverse drug reactions, diagnosis change, or new procedure performed?: [x] No    [] Yes (see summary sheet for update)  Subjective functional status/changes:   [] No changes reported  Pt reports back is feeling good and she has been able to complete full work shifts without back pain. However, she has been experiencing some unrelated pain in her left wrist and right shoulder; she has a referral for her right shoulder. OBJECTIVE           Modality rationale: decrease pain and increase tissue extensibility to improve the patients ability to return to work with decreased pain. Min Type Additional Details    []? Estim: []? Att   [x]? Unatt        []? TENS instruct                  []?IFC  []? Premod   []? NMES                     []?Other:  []?w/US   []?w/ice   []?w/heat  Position:   Location:      []? Traction: []? Cervical       []? Lumbar                       []? Prone          []? Supine                       []?Intermittent   []? Continuous Lbs:  []? before manual  []? after manual  []?w/heat     []? Ultrasound: []? Continuous   []? Pulsed at:                           []? 1MHz   []? 3MHz Location:  W/cm2:     []? Paraffin         Location:   []?w/heat    10 [x]? Ice     []? Heat  []? Ice massage Position: Sitting  Location: R Shoulder     []? Laser  []? Other: Position:  Location:        []? Vasopneumatic Device Pressure:       []? lo []? med []? hi   Temperature:       [x]? Skin assessment post-treatment:  [x]? intact []? redness- no adverse reaction    []? redness  adverse reaction:     41 min Therapeutic Exercise:  [] See flow sheet :   Rationale: increase ROM, increase strength and increase proprioception to improve the patients ability to return to PLOF with decreased pain or discomfort. With   [x] TE   [] TA   [] neuro   [] other: Patient Education: [x] Review HEP    [] Progressed/Changed HEP based on:   [] positioning   [] body mechanics   [] transfers   [] heat/ice application    [] other:      Other Objective/Functional Measures: --     Pain Level (0-10 scale) post treatment: 0/10    ASSESSMENT/Changes in Function:      []  See Plan of Care  []  See progress note/recertification  [x]  See Discharge Summary         Progress towards goals / Updated goals:  Short Term Goals: To be accomplished in 8 treatments:               1. Pt will be independent and compliant with HEP. - MET               2. Pt will demonstrate >/= 75% lumbar AROM before onset of increased pain. - MET   Long Term Goals: To be accomplished in 16 treatments:               1. Pt will be independent and compliant with HEP. - MET               2. Pt will improve FOTO score by the MCID demonstrating improved overall function with decreased pain or discomfort. - Not assessed               3. Pt will demonstrate 100% lumbar AROM without complaints of back pain. - MET (mild discomfort at end-range extension)               4. Pt will be able to tolerate prolonged standing >/= 30 minutes without complaints of back pain. - MET               5. Pt will be able to tolerate prolonged sitting >/= 30 minutes without complaints of back pain. - MET               6. 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 - MET     PLAN  []  Upgrade activities as tolerated     []  Continue plan of care  []  Update interventions per flow sheet       [x]  Discharge due to: Pt reaching or progressing towards all short and long-term therapy goals.   []  Other:_       Darcy Kidney Remi PT, DPT 6/9/2020

## 2020-06-09 NOTE — PROGRESS NOTES
[facility]  [address] [address], [address] [place] [address], [address] [place] [address]  Phone: [phone]  Fax: [phone]    Discharge Summary  2-15    Patient name: Ana Rosa Bauman  : 1995  Provider#: [id number]  Referral source: Abilio Diallo DO      Medical/Treatment Diagnosis: Low back pain [M54.5]     Prior Hospitalization: see medical history     Comorbidities: none  Prior Level of Function: Patient completed 20 minutes of exercise seldom or never. Medications: Verified on Patient Summary List     Start of Care: 20                                                                 Onset Date: 3/30/20        Visits from Start of Care: 6     Missed Visits: 0  Reporting Period : 20 to 20    ASSESSMENT/SUMMARY OF CARE: Ms. Janice Snellen was seen for a total of 6 skilled PT visits secondary to back pain resulting from a work-related injury. Pt reports feeling 90% improved overall since evaluation and has returned to working full work-shifts without restrictions or complaints of increased back pain. She demonstrates full lumbar AROM and is now able to tolerate prolonged positions without pain. Pt has reached all short and long-term therapy goals and has been provided a comprehensive HEP to further progress independently. Thank you for this referral!    Short Term Goals: To be accomplished in 8 treatments:               1. Pt will be independent and compliant with HEP. - MET               2. Pt will demonstrate >/= 75% lumbar AROM before onset of increased pain. - MET   Long Term Goals: To be accomplished in 16 treatments:               1. Pt will be independent and compliant with HEP. - MET               2. Pt will improve FOTO score by the MCID demonstrating improved overall function with decreased pain or discomfort. - Not assessed               3. Pt will demonstrate 100% lumbar AROM without complaints of back pain.  - MET (mild discomfort at end-range extension)               4. Pt will be able to tolerate prolonged standing >/= 30 minutes without complaints of back pain. - MET               9. Pt will be able to tolerate prolonged sitting >/= 30 minutes without complaints of back pain. - MET               6. 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  - MET     RECOMMENDATIONS:  [x]Discontinue therapy: [x]Patient has reached or is progressing toward set goals      []Patient is non-compliant or has abdicated      []Due to lack of appreciable progress towards set goals    Russ Amaya, PT, DPT 6/9/2020

## 2020-06-17 ENCOUNTER — HOSPITAL ENCOUNTER (OUTPATIENT)
Dept: PHYSICAL THERAPY | Age: 25
Discharge: HOME | End: 2020-06-17
Payer: COMMERCIAL

## 2020-06-17 PROCEDURE — 97110 THERAPEUTIC EXERCISES: CPT

## 2020-06-17 PROCEDURE — 97016 VASOPNEUMATIC DEVICE THERAPY: CPT

## 2020-06-17 PROCEDURE — 97161 PT EVAL LOW COMPLEX 20 MIN: CPT

## 2020-06-17 NOTE — PROGRESS NOTES
[facility]  [address] [address]  Phone: [phone]  Fax: [phone]    Plan of Care/Statement of Necessity for Physical Therapy Services  2-15    Patient name: Rhonda Tineo  : 1995  Provider#: [id number]  Referral source: Angelo Ward NP      Medical/Treatment Diagnosis: Right shoulder pain [M25.511]     Prior Hospitalization: see medical history     Comorbidities: none  Prior Level of Function: Patient completed 20 minutes of exercise seldom or never. Medications: Verified on Patient Summary List    Start of Care: 20      Onset Date: Acute on chronic (aggravated 2 weeks ago)       The Plan of Care and following information is based on the information from the initial evaluation. Assessment/ key information: Pt is a very pleasant and motivated 22year old female who was referred to skilled PT for acute right shoulder pain. Pt reports primary complaints of constant ache and occasional sharp (with movement) pain within superior and anterior right shoulder. 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     Evaluation Complexity History LOW Complexity : Zero comorbidities / personal factors that will impact the outcome / POC; Examination HIGH Complexity : 4+ Standardized tests and measures addressing body structure, function, activity limitation and / or participation in recreation  ;Presentation LOW Complexity : Stable, uncomplicated    Overall Complexity Rating: LOW     Problem List: pain affecting function, decrease ROM, decrease strength, decrease ADL/ functional abilitiies, decrease activity tolerance and decrease flexibility/ joint mobility   Treatment Plan may include any combination of the following: Therapeutic exercise, Therapeutic activities, Neuromuscular re-education, Physical agent/modality, Manual therapy, Patient education, Self Care training and Functional mobility training  Patient / Family readiness to learn indicated by: asking questions, trying to perform skills and interest  Persons(s) to be included in education: patient (P)  Barriers to Learning/Limitations: None  Patient Goal (s): Pain management  Patient Self Reported Health Status: good  Rehabilitation Potential: good    Short and Long Term Goals: To be accomplished in 16 treatments:   1. Pt will be independent and compliant with HEP. 2. Pt will improve FOTO score by the MCID demonstrating improved overall function with decreased pain or discomfort. 3. Pt will be able to raise her arm overhead without complaints of increased right shoulder pain. 4. Pt will be able to reach across her body without complaints of increased right shoulder pain. 5. Pt will be able to put on her belt without complaints of increased right shoulder pain. 6. Pt will be able to hold a milk gallon without complaints of right shoulder pain. 7. Pt will be able to perform all work duties without restriction or complaints of right shoulder pain. Frequency / Duration: Patient to be seen 1-2 times per week for 12 treatments. Patient/ Caregiver education and instruction: self care, activity modification and exercises    [x]  Plan of care has been reviewed with DEEPAK Reilly PT, DPT 6/17/2020     ________________________________________________________________________    I certify that the above Therapy Services are being furnished while the patient is under my care. I agree with the treatment plan and certify that this therapy is necessary.     [de-identified] Signature:____________________  Date:____________Time: _________

## 2020-06-17 NOTE — PROGRESS NOTES
PT INITIAL EVALUATION NOTE - [facility] 2-15    Patient Name: Rama Peacock  Date:2020  : 1995  [x]  Patient  Verified  Payor: Ankur Sommer / Plan: [address] HMO / Product Type: HMO /    In time:9:17 am  Out time:10:07 am  Total Treatment Time (min): 50  Total Timed Codes (min): 9  1:1 Treatment Time ( [address] only): --   Visit #: 1     Treatment Area: Right shoulder pain [M25.511]    SUBJECTIVE  Pain Level (0-10 scale): 4/10  Any medication changes, allergies to medications, adverse drug reactions, diagnosis change, or new procedure performed?: [] No    [x] Yes (see summary sheet for update)  Subjective:    Pt was referred to skilled PT for acute right shoulder pain. Today, Pt reports primary complaints of constant ache and occasional sharp (with movement) pain within superior and anterior right shoulder. Pt denies numbness/tingling. Pain at worst: 10/10; at best: 2/10. Mechanism of Injury: Pain has been on/off over past couple of years. However, the pain is much more constant and intense beginning approximately 2 weeks ago. No specific BHAVNA. Aggravating factors include reaching behind her back, reaching overhead, lifting objects, holding >/= milk gallon, right side-lying. Relieving factors include rest, ice, ibuprofen. PLOF: History of cheering in high school without back issues; yard work, moving furniture. 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 X-ray: No acute abnormalities    Pt Goals: Pain management     OBJECTIVE  Posture:  Mild rounded shoulders  Other Observations:  --  Functional  and Pinch:  Normal  Palpation: TTP proximal lateral right shoulder, R subscap, R bicipital groove    R Shoulder ROM:  AROM    PROM   Flexion   140 p! Lat shoulder WNL   Abduction  90 p! Lat shoulder WNL   Adduction  WNL p! At end-range P! At end-range   IR   Hand to T7 (T3) 70 mild p! At end-range   ER   Hand to C7 (T4) 90 no p! L Shoulder ROM:  AROM      Flexion   WNL      Abduction  WNL      Adduction  WNL      IR   Hand to T3     ER   Hand to T4      UPPER QUARTER   MUSCLE STRENGTH  KEY       R  L  0 - No Contraction   Flexion  4- mod p! 5  1 - Trace    Extension NT  NT  2 - Poor    Abduction 4 p!  5  3 - Fair     IR  4- mod p! 5  4 - Good    ER  4+ mild p! 5  5 - Normal       Neurological: Reflexes / Sensations: NT  Special Tests: Neer Impingement: (+)  Ruffin-Gio: (+)      Scapular Reposition: (-)  Scapular assist: (+)     Crank: NT    Load and Shift: (-)    Elmore: (+)    Apprehension: (-)    Relocation: (-)    Speed's: NT    AC Crossover: (+)   AC Compression: (-)          Modality rationale: decrease inflammation and decrease pain to improve the patients ability to raise her arm overhead with decreased pain or discomfort.    Min Type Additional Details    [] Estim: []Att   []Unatt        []TENS instruct                  []IFC  []Premod   []NMES                     []Other:  []w/US   []w/ice   []w/heat  Position:  Location:    []  Traction: [] Cervical       []Lumbar                       [] Prone          []Supine                       []Intermittent   []Continuous Lbs:  [] before manual  [] after manual  []w/heat    []  Ultrasound: []Continuous   [] Pulsed at:                           []1MHz   []3MHz Location:  W/cm2:    [] Paraffin         Location:   []w/heat    []  Ice     []  Heat  []  Ice massage Position:  Location:    []  Laser  []  Other: Position:  Location:   10   [x]  Vasopneumatic Device Pressure:       [x] lo [] med [] hi   Temperature: 34     [x] Skin assessment post-treatment:  [x]intact []redness- no adverse reaction    []redness  adverse reaction:     9 min Therapeutic Exercise:  [] See flow sheet :   Rationale: increase ROM and increase strength to improve the patients ability to perform functional activities with decreased pain or discomfort. With   [x] TE   [] TA   [] neuro   [] other: Patient Education: [x] Review HEP    [] Progressed/Changed HEP based on:   [] positioning   [] body mechanics   [] transfers   [] heat/ice application    [x] other: Educated Pt regarding impairments, role of PT, and POC. Other Objective/Functional Measures:  FOTO: E-mailed to Pt    Pain Level (0-10 scale) post treatment: 4/10    ASSESSMENT/Changes in Function:     [x]  See Plan of [address] Jacques PT, DPT 6/17/2020

## 2020-06-23 ENCOUNTER — HOSPITAL ENCOUNTER (OUTPATIENT)
Dept: PHYSICAL THERAPY | Age: 25
Discharge: HOME | End: 2020-06-23
Payer: COMMERCIAL

## 2020-06-23 PROCEDURE — 97016 VASOPNEUMATIC DEVICE THERAPY: CPT

## 2020-06-23 PROCEDURE — 97110 THERAPEUTIC EXERCISES: CPT

## 2020-06-23 NOTE — PROGRESS NOTES
PT DAILY TREATMENT NOTE - [facility] 2-15    Patient Name: Tiffanie Wallace  Date:2020  : 1995  [x]  Patient  Verified  Payor: Cheryle Hopkins / Plan: Alley [employer] Se HMO / Product Type: HMO /    In time:3:35 pm  Out time:4:33 pm  Total Treatment Time (min): 62  Total Timed Codes (min): 47  1:1 Treatment Time ([facility] only): --   Visit #:  2    Treatment Area: Right shoulder pain [M25.511]    SUBJECTIVE  Pain Level (0-10 scale): 0/10  Any medication changes, allergies to medications, adverse drug reactions, diagnosis change, or new procedure performed?: [x] No    [] Yes (see summary sheet for update)  Subjective functional status/changes:   [] No changes reported  Pt reports she has been doing really well and has not had much of any pain since evaluation. 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 The injection last Tuesday seemed to have provided significant relief and she has been diligent with her isometrics and icing. OBJECTIVE    Modality rationale: decrease inflammation and decrease pain to improve the patients ability to reach arm overhead with decreased pain or discomfort.    Min Type Additional Details       [] Estim: []Att   []Unatt    []TENS instruct                  []IFC  []Premod   []NMES                     []Other:  []w/US   []w/ice   []w/heat  Position:  Location:       []  Traction: [] Cervical       []Lumbar                       [] Prone          []Supine                       []Intermittent   []Continuous Lbs:  [] before manual  [] after manual  []w/heat    []  Ultrasound: []Continuous   [] Pulsed                       at: []1MHz   []3MHz Location:  W/cm2:    [] Paraffin         Location:   []w/heat    []  Ice     []  Heat  []  Ice massage Position:  Location:    []  Laser  []  Other: Position:  Location:      []  Vasopneumatic Device Pressure:       [] lo [] med [] hi   Temperature:      [x] Skin assessment post-treatment:  [x]intact []redness- no adverse reaction    []redness  adverse reaction:     42 min Therapeutic Exercise:  [] See flow sheet :   Rationale: increase ROM, increase strength and increase proprioception to improve the patients ability to reach her arm overhead with decreased pain or discomfort. 5 min Neuromuscular Re-education:  []  See flow sheet : 4-way rhythmic stab at 45 degrees abduction in supine   Rationale: increase ROM, increase strength and increase proprioception  to improve the patients ability to reach her arm overhead with decreased pain or discomfort. With   [x] TE   [] TA   [] neuro   [] other: Patient Education: [x] Review HEP    [] Progressed/Changed HEP based on:   [] positioning   [] body mechanics   [] transfers   [] heat/ice application    [] other:      Other Objective/Functional Measures:   R Glenohumeral AROM  Flexion: 140 p! Abduction: 130 p! Pain Level (0-10 scale) post treatment: 0/10    ASSESSMENT/Changes in Function:   Pt demonstrates significant improvement with regards to her right shoulder abduction AROM since evaluation. Pt unable to perform serratus punches or prone scap retractions due to shoulder pain. Introduced standing rows and shoulder extension to begin improving scapular control and activation in pain-free manner. Pt only noted pain with shoulder flexion and abduction isometrics today; able to perform full sets with reduced power (40%). Patient will continue to benefit from skilled PT services to modify and progress therapeutic interventions, address functional mobility deficits, address ROM deficits, address strength deficits, analyze and address soft tissue restrictions, analyze and cue movement patterns, analyze and modify body mechanics/ergonomics and assess and modify postural abnormalities to attain remaining goals.      [x]  See Plan of Care  []  See progress note/recertification  []  See Discharge Summary         Progress towards goals / Updated goals:  Short and Long Term Goals: To be accomplished in 16 treatments:               1. Pt will be independent and compliant with HEP. 2. Pt will improve FOTO score by the MCID demonstrating improved overall function with decreased pain or discomfort. 3. Pt will be able to raise her arm overhead without complaints of increased right shoulder pain. 4. Pt will be able to reach across her body without complaints of increased right shoulder pain. 5. Pt will be able to put on her belt without complaints of increased right shoulder pain. 6. Pt will be able to hold a milk gallon without complaints of right shoulder pain. 7. Pt will be able to perform all work duties without restriction or complaints of right shoulder pain.     PLAN  [x]  Upgrade activities as tolerated     [x]  Continue plan of care  []  Update interventions per flow sheet       []  Discharge due to:_  []  Other:_      Sue Kohler, PT, DPT 6/23/2020

## 2020-06-25 ENCOUNTER — HOSPITAL ENCOUNTER (OUTPATIENT)
Dept: PHYSICAL THERAPY | Age: 25
Discharge: HOME | End: 2020-06-25
Payer: COMMERCIAL

## 2020-06-25 PROCEDURE — 97110 THERAPEUTIC EXERCISES: CPT

## 2020-06-25 PROCEDURE — 97016 VASOPNEUMATIC DEVICE THERAPY: CPT

## 2020-06-25 PROCEDURE — 97112 NEUROMUSCULAR REEDUCATION: CPT

## 2020-06-25 NOTE — PROGRESS NOTES
PT DAILY TREATMENT NOTE - [facility] 2-15    Patient Name: Rafi Anglin  Date:2020  : 1995  [x]  Patient  Verified  Payor: Logan Martinez / Plan: Alley [employer] Se HMO / Product Type: HMO /    In time: 3:35 pm  Out time:4:34 pm  Total Treatment Time (min): 59  Total Timed Codes (min): 49  1:1 Treatment Time ([facility] only): --   Visit #:  3    Treatment Area: Right shoulder pain [M25.511]    SUBJECTIVE  Pain Level (0-10 scale): 6/10  Any medication changes, allergies to medications, adverse drug reactions, diagnosis change, or new procedure performed?: [x] No    [] Yes (see summary sheet for update)  Subjective functional status/changes:   [] No changes reported  Pt states her shoulder is very achy today along proximal lateral right arm. OBJECTIVE    Modality rationale: decrease inflammation and decrease pain to improve the patients ability to reach arm overhead with decreased pain or discomfort.    Min Type Additional Details       [] Estim: []Att   []Unatt    []TENS instruct                  []IFC  []Premod   []NMES                     []Other:  []w/US   []w/ice   []w/heat  Position:  Location:       []  Traction: [] Cervical       []Lumbar                       [] Prone          []Supine                       []Intermittent   []Continuous Lbs:  [] before manual  [] after manual  []w/heat    []  Ultrasound: []Continuous   [] Pulsed                       at: []1MHz   []3MHz Location:  W/cm2:    [] Paraffin         Location:   []w/heat    []  Ice     []  Heat  []  Ice massage Position:  Location:    []  Laser  []  Other: Position:  Location:   10   [x]  Vasopneumatic Device Pressure:       [] lo [x] med [] hi   Temperature: 34     [x] Skin assessment post-treatment:  [x]intact []redness- no adverse reaction    []redness  adverse reaction:     38 min Therapeutic Exercise:  [] See flow sheet :   Rationale: increase ROM, increase strength and increase proprioception to improve the patients ability to reach her arm overhead with decreased pain or discomfort. 11 min Neuromuscular Re-education:  []  See flow sheet : 4-way rhythmic stab at 45 degrees abduction in supine; PNF Agonist-reversal of R scapula in left side-lying   Rationale: increase ROM, increase strength and increase proprioception  to improve the patients ability to reach her arm overhead with decreased pain or discomfort. With   [x] TE   [] TA   [] neuro   [] other: Patient Education: [x] Review HEP    [] Progressed/Changed HEP based on:   [] positioning   [] body mechanics   [] transfers   [] heat/ice application    [] other:      Other Objective/Functional Measures:   R Glenohumeral AROM  Flexion: 143 p!  --> 150 no p! (post neuro re-ed)  Abduction: 120 p! --> 130 p!    (+) Scapular assist     Pain Level (0-10 scale) post treatment: 5/10    ASSESSMENT/Changes in Function:   Due to scapular assist test, performed PNF scapular agonist-reversal to improve activation of scapular muscles and scapular control with glenohumeral movement; Pt demonstrated improved glenohumeral flexion and abduction afterwards secondary to neuro re-ed. Reinforced with exercises targeting muscles assisting with scapular upward rotation. Pt   Patient will continue to benefit from skilled PT services to modify and progress therapeutic interventions, address functional mobility deficits, address ROM deficits, address strength deficits, analyze and address soft tissue restrictions, analyze and cue movement patterns, analyze and modify body mechanics/ergonomics and assess and modify postural abnormalities to attain remaining goals. [x]  See Plan of Care  []  See progress note/recertification  []  See Discharge Summary         Progress towards goals / Updated goals:  Short and Long Term Goals: To be accomplished in 16 treatments:               1. Pt will be independent and compliant with HEP.                2. Pt will improve FOTO score by the MCID demonstrating improved overall function with decreased pain or discomfort. 3. Pt will be able to raise her arm overhead without complaints of increased right shoulder pain. 4. Pt will be able to reach across her body without complaints of increased right shoulder pain. 5. Pt will be able to put on her belt without complaints of increased right shoulder pain. 6. Pt will be able to hold a milk gallon without complaints of right shoulder pain. 7. Pt will be able to perform all work duties without restriction or complaints of right shoulder pain.     PLAN  [x]  Upgrade activities as tolerated     [x]  Continue plan of care  []  Update interventions per flow sheet       []  Discharge due to:_  []  Other:_      Carlotta Connor, PT, DPT 6/25/2020

## 2020-06-30 ENCOUNTER — HOSPITAL ENCOUNTER (OUTPATIENT)
Dept: PHYSICAL THERAPY | Age: 25
Discharge: HOME | End: 2020-06-30
Payer: COMMERCIAL

## 2020-06-30 PROCEDURE — 97110 THERAPEUTIC EXERCISES: CPT

## 2020-06-30 PROCEDURE — 97016 VASOPNEUMATIC DEVICE THERAPY: CPT

## 2020-06-30 PROCEDURE — 97112 NEUROMUSCULAR REEDUCATION: CPT

## 2020-06-30 NOTE — PROGRESS NOTES
PT DAILY TREATMENT NOTE - [facility] 2-15    Patient Name: Micheal Virgen  Date:2020  : 1995  [x]  Patient  Verified  Payor: Jignesh Real / Plan: Alley [employer] Se HMO / Product Type: HMO /    In time: 3:18 pm  Out time:4:22 pm  Total Treatment Time (min): 64  Total Timed Codes (min): 54  1:1 Treatment Time ( [address] only): --   Visit #:  4    Treatment Area: Right shoulder pain [M25.511]    SUBJECTIVE  Pain Level (0-10 scale): 6/10  Any medication changes, allergies to medications, adverse drug reactions, diagnosis change, or new procedure performed?: [x] No    [] Yes (see summary sheet for update)  Subjective functional status/changes:   [] No changes reported  Pt reports she was very achy in her right shoulder by the end of her work day yesterday. She felt okay after last session and throughout the weekend. OBJECTIVE    Modality rationale: decrease inflammation and decrease pain to improve the patients ability to reach arm overhead with decreased pain or discomfort.    Min Type Additional Details       [] Estim: []Att   []Unatt    []TENS instruct                  []IFC  []Premod   []NMES                     []Other:  []w/US   []w/ice   []w/heat  Position:  Location:       []  Traction: [] Cervical       []Lumbar                       [] Prone          []Supine                       []Intermittent   []Continuous Lbs:  [] before manual  [] after manual  []w/heat    []  Ultrasound: []Continuous   [] Pulsed                       at: []1MHz   []3MHz Location:  W/cm2:    [] Paraffin         Location:   []w/heat    []  Ice     []  Heat  []  Ice massage Position:  Location:    []  Laser  []  Other: Position:  Location:   10   [x]  Vasopneumatic Device Pressure:       [] lo [x] med [] hi   Temperature: 34     [x] Skin assessment post-treatment:  [x]intact []redness- no adverse reaction    []redness  adverse reaction:     29 min Therapeutic Exercise:  [] See flow sheet :   Rationale: increase ROM, increase strength and increase proprioception to improve the patients ability to reach her arm overhead with decreased pain or discomfort. 25 min Neuromuscular Re-education:  []  See flow sheet : 4-way rhythmic stab at 45 degrees abduction in supine; PNF Agonist-reversal of R scapula in left side-lying; D2 PNF with light repeated contractions   Rationale: increase ROM, increase strength and increase proprioception  to improve the patients ability to reach her arm overhead with decreased pain or discomfort. With   [x] TE   [] TA   [] neuro   [] other: Patient Education: [x] Review HEP    [] Progressed/Changed HEP based on:   [] positioning   [] body mechanics   [] transfers   [] heat/ice application    [] other:      Other Objective/Functional Measures:   R Glenohumeral AROM  Flexion: 140 p!  --> 148 (post neuro re-ed)  Abduction: 120 p! --> 110 p!    (+) Scapular assist     Pain Level (0-10 scale) post treatment: 5/10    ASSESSMENT/Changes in Function:   Pt noted mild achiness with ER isometric walk-outs, no pain with IR walk-outs; cued to decrease step width and limit sets to 8 and Pt able to perform without  increased pain. Pt demonstrated improved glenohumeral flexion, though decreased abduction AROM at the end of today's session, noting ache pain at 110 degrees. Patient will continue to benefit from skilled PT services to modify and progress therapeutic interventions, address functional mobility deficits, address ROM deficits, address strength deficits, analyze and address soft tissue restrictions, analyze and cue movement patterns, analyze and modify body mechanics/ergonomics and assess and modify postural abnormalities to attain remaining goals. [x]  See Plan of Care  []  See progress note/recertification  []  See Discharge Summary         Progress towards goals / Updated goals:  Short and Long Term Goals: To be accomplished in 16 treatments:               1. Pt will be independent and compliant with HEP. 2. Pt will improve FOTO score by the MCID demonstrating improved overall function with decreased pain or discomfort. 3. Pt will be able to raise her arm overhead without complaints of increased right shoulder pain. 4. Pt will be able to reach across her body without complaints of increased right shoulder pain. 5. Pt will be able to put on her belt without complaints of increased right shoulder pain. 6. Pt will be able to hold a milk gallon without complaints of right shoulder pain. 7. Pt will be able to perform all work duties without restriction or complaints of right shoulder pain.     PLAN  [x]  Upgrade activities as tolerated     [x]  Continue plan of care  []  Update interventions per flow sheet       []  Discharge due to:_  []  Other:_      Carmen Villatoro PT, DPT 6/30/2020

## 2020-07-01 ENCOUNTER — HOSPITAL ENCOUNTER (OUTPATIENT)
Dept: PHYSICAL THERAPY | Age: 25
Discharge: HOME | End: 2020-07-01
Payer: COMMERCIAL

## 2020-07-01 PROCEDURE — 97016 VASOPNEUMATIC DEVICE THERAPY: CPT

## 2020-07-01 PROCEDURE — 97112 NEUROMUSCULAR REEDUCATION: CPT

## 2020-07-01 PROCEDURE — 97110 THERAPEUTIC EXERCISES: CPT

## 2020-07-01 NOTE — PROGRESS NOTES
PT DAILY TREATMENT NOTE - [facility] 2-15    Patient Name: Andie Norris  Date:2020  : 1995  [x]  Patient  Verified  Payor: Rick Doss / Plan: [address] HMO / Product Type: HMO /    In time: 3:15 pm  Out time: 4:14 pm  Total Treatment Time (min): 59  Total Timed Codes (min): 49  1:1 Treatment Time ( only): --   Visit #:  5    Treatment Area: Right shoulder pain [M25.511]    SUBJECTIVE  Pain Level (0-10 scale): 2/10  Any medication changes, allergies to medications, adverse drug reactions, diagnosis change, or new procedure performed?: [x] No    [] Yes (see summary sheet for update)  Subjective functional status/changes:   [] No changes reported  Pt reports her shoulder has felt better since yesterday. OBJECTIVE    Modality rationale: decrease inflammation and decrease pain to improve the patients ability to reach arm overhead with decreased pain or discomfort.    Min Type Additional Details       [] Estim: []Att   []Unatt    []TENS instruct                  []IFC  []Premod   []NMES                     []Other:  []w/US   []w/ice   []w/heat  Position:  Location:       []  Traction: [] Cervical       []Lumbar                       [] Prone          []Supine                       []Intermittent   []Continuous Lbs:  [] before manual  [] after manual  []w/heat    []  Ultrasound: []Continuous   [] Pulsed                       at: []1MHz   []3MHz Location:  W/cm2:    [] Paraffin         Location:   []w/heat    []  Ice     []  Heat  []  Ice massage Position:  Location:    []  Laser  []  Other: Position:  Location:   10   [x]  Vasopneumatic Device Pressure:       [] lo [x] med [] hi   Temperature: 34     [x] Skin assessment post-treatment:  [x]intact []redness- no adverse reaction    []redness  adverse reaction:     25 min Therapeutic Exercise:  [] See flow sheet :   Rationale: increase ROM, increase strength and increase proprioception to improve the patients ability to reach her arm overhead with decreased pain or discomfort. 24 min Neuromuscular Re-education:  []  See flow sheet : 4-way rhythmic stab at 45 degrees and 90 degrees abduction in supine; D2 PNF with light repeated contractions   Rationale: increase ROM, increase strength and increase proprioception  to improve the patients ability to reach her arm overhead with decreased pain or discomfort. With   [x] TE   [] TA   [] neuro   [] other: Patient Education: [x] Review HEP    [] Progressed/Changed HEP based on:   [] positioning   [] body mechanics   [] transfers   [] heat/ice application    [] other:      Other Objective/Functional Measures:   R Glenohumeral AROM  Flexion: 150 mild p  Abduction: 120     (+) Scapular assist     Pain Level (0-10 scale) post treatment: 2/10    ASSESSMENT/Changes in Function:   Introduced side-lying shoulder flexion and abduction in order to facilitate improved functional movement and scapular control with decreased gravity resistance; Pt noted sharp anterior shoulder pain on last rep of last set of shoulder flexion, though was able to perform abduction without pain. Pt able to perform ER walk-outs without any shoulder pain today. Patient will continue to benefit from skilled PT services to modify and progress therapeutic interventions, address functional mobility deficits, address ROM deficits, address strength deficits, analyze and address soft tissue restrictions, analyze and cue movement patterns, analyze and modify body mechanics/ergonomics and assess and modify postural abnormalities to attain remaining goals. [x]  See Plan of Care  []  See progress note/recertification  []  See Discharge Summary         Progress towards goals / Updated goals:  Short and Long Term Goals: To be accomplished in 16 treatments:               1. Pt will be independent and compliant with HEP. 2. Pt will improve FOTO score by the MCID demonstrating improved overall function with decreased pain or discomfort. 3. Pt will be able to raise her arm overhead without complaints of increased right shoulder pain. 4. Pt will be able to reach across her body without complaints of increased right shoulder pain. 5. Pt will be able to put on her belt without complaints of increased right shoulder pain. 6. Pt will be able to hold a milk gallon without complaints of right shoulder pain. 7. Pt will be able to perform all work duties without restriction or complaints of right shoulder pain.     PLAN  [x]  Upgrade activities as tolerated     [x]  Continue plan of care  []  Update interventions per flow sheet       []  Discharge due to:_  []  Other:_      Anthony Brambila PT, DPT 7/1/2020

## 2020-07-14 ENCOUNTER — APPOINTMENT (OUTPATIENT)
Dept: PHYSICAL THERAPY | Age: 25
End: 2020-07-14
Payer: COMMERCIAL

## 2020-07-15 ENCOUNTER — HOSPITAL ENCOUNTER (OUTPATIENT)
Dept: PHYSICAL THERAPY | Age: 25
Discharge: HOME | End: 2020-07-15
Payer: COMMERCIAL

## 2020-07-15 PROCEDURE — 97110 THERAPEUTIC EXERCISES: CPT

## 2020-07-15 NOTE — PROGRESS NOTES
PT DAILY TREATMENT NOTE - [facility] 2-15    Patient Name: Hugh An  Date:7/15/2020  : 1995  [x]  Patient  Verified  Payor: Landonkathleen Mahoney / Plan: Alley [employer] Se HMO / Product Type: HMO /    In time: 4:12 pm  Out time: 4:51 pm  Total Treatment Time (min): 39  Total Timed Codes (min): 39  1:1 Treatment Time ( [address] only): --   Visit #:  6    Treatment Area: Right shoulder pain [M25.511]    SUBJECTIVE  Pain Level (0-10 scale): 0/10  Any medication changes, allergies to medications, adverse drug reactions, diagnosis change, or new procedure performed?: [x] No    [] Yes (see summary sheet for update)  Subjective functional status/changes:   [] No changes reported  Pt reports her shoulder has been feeling a lot better. She has not had any pain in the past couple of days. OBJECTIVE    39 min Therapeutic Exercise:  [] See flow sheet :   Rationale: increase ROM, increase strength and increase proprioception to improve the patients ability to reach her arm overhead with decreased pain or discomfort. With   [x] TE   [] TA   [] neuro   [] other: Patient Education: [x] Review HEP    [] Progressed/Changed HEP based on:   [] positioning   [] body mechanics   [] transfers   [] heat/ice application    [] other:      Other Objective/Functional Measures:    R Glenohumeral AROM  Flexion: 160 no pain  Abduction: 145 mild pain at end-range   IR: T4 no pain  ER: T4 no pain    (+) Scapular assist     Pain Level (0-10 scale) post treatment: 0/10    ASSESSMENT/Changes in Function:   Pt demonstrated significantly improved right glenohumeral AROM today; only noted pain at end-range abduction. Progressed from ER/IR isometric walk-outs to active motion. Session limited due to Pt not feeling well.   Patient will continue to benefit from skilled PT services to modify and progress therapeutic interventions, address functional mobility deficits, address ROM deficits, address strength deficits, analyze and address soft tissue restrictions, analyze and cue movement patterns, analyze and modify body mechanics/ergonomics and assess and modify postural abnormalities to attain remaining goals. [x]  See Plan of Care  []  See progress note/recertification  []  See Discharge Summary         Progress towards goals / Updated goals:  Short and Long Term Goals: To be accomplished in 16 treatments:               1. Pt will be independent and compliant with HEP. 2. Pt will improve FOTO score by the MCID demonstrating improved overall function with decreased pain or discomfort. 3. Pt will be able to raise her arm overhead without complaints of increased right shoulder pain. 4. Pt will be able to reach across her body without complaints of increased right shoulder pain. 5. Pt will be able to put on her belt without complaints of increased right shoulder pain. 6. Pt will be able to hold a milk gallon without complaints of right shoulder pain. 7. Pt will be able to perform all work duties without restriction or complaints of right shoulder pain.     PLAN  [x]  Upgrade activities as tolerated     [x]  Continue plan of care  []  Update interventions per flow sheet       []  Discharge due to:_  []  Other:_      Ale Landa, PT, DPT 7/15/2020

## 2020-07-21 ENCOUNTER — APPOINTMENT (OUTPATIENT)
Dept: PHYSICAL THERAPY | Age: 25
End: 2020-07-21
Payer: COMMERCIAL

## 2020-07-23 ENCOUNTER — HOSPITAL ENCOUNTER (OUTPATIENT)
Dept: PHYSICAL THERAPY | Age: 25
Discharge: HOME | End: 2020-07-23
Payer: COMMERCIAL

## 2020-07-23 PROCEDURE — 97110 THERAPEUTIC EXERCISES: CPT

## 2020-07-23 PROCEDURE — 97112 NEUROMUSCULAR REEDUCATION: CPT

## 2020-07-23 NOTE — PROGRESS NOTES
PT DAILY TREATMENT NOTE - [facility] 2-15    Patient Name: Jade Lee  Date:2020  : 1995  [x]  Patient  Verified  Payor: [employer] / Plan: [address] HMO / Product Type: HMO /    In time: 4:20 pm  Out time: 4:56 pm  Total Treatment Time (min): 36  Total Timed Codes (min): 36  1:1 Treatment Time ([facility] only): --   Visit #:  7    Treatment Area: Right shoulder pain [M25.511]    SUBJECTIVE  Pain Level (0-10 scale): 0/10  Any medication changes, allergies to medications, adverse drug reactions, diagnosis change, or new procedure performed?: [x] No    [] Yes (see summary sheet for update)  Subjective functional status/changes:   [] No changes reported  Pt reports she has been doing great. She states she has full shoulder motion and no pain. OBJECTIVE    27 min Therapeutic Exercise:  [] See flow sheet :   Rationale: increase ROM, increase strength and increase proprioception to improve the patients ability to reach her arm overhead with decreased pain or discomfort. 9 min Neuromuscular Re-education:  []  See flow sheet :   Rationale: increase ROM, increase strength and increase proprioception  to improve the patients ability to return to PLOF with decreased pain or discomfort. With   [x] TE   [] TA   [] neuro   [] other: Patient Education: [x] Review HEP    [] Progressed/Changed HEP based on:   [] positioning   [] body mechanics   [] transfers   [] heat/ice application    [] other:      Other Objective/Functional Measures:    0-100: 100% improved    (Eval to today)       R Shoulder ROM:      AROM                                            Flexion                         140 p! Lat shoulder  180 (no pain)                 Abduction                    90 p! Lat shoulder 180 (no pain)                  Adduction                    WNL p! At end-range  P!  At end-range WNL (no pain)              IR                                 Hand to T7 (T3)  T3 (no pain)                      ER Hand to C7 (T4) T4 (no pain)            Pain Level (0-10 scale) post treatment: 0/10    ASSESSMENT/Changes in Function:     []  See Plan of Care  []  See progress note/recertification  [x]  See Discharge Summary         Progress towards goals / Updated goals:  Short and Long Term Goals: To be accomplished in 16 treatments:               1. Pt will be independent and compliant with HEP. - MET               2. Pt will improve FOTO score by the MCID demonstrating improved overall function with decreased pain or discomfort.  - MET               3. Pt will be able to raise her arm overhead without complaints of increased right shoulder pain. - MET               4. Pt will be able to reach across her body without complaints of increased right shoulder pain. - MET               5. Pt will be able to put on her belt without complaints of increased right shoulder pain. - MET               6. Pt will be able to hold a milk gallon without complaints of right shoulder pain. - MET               7. Pt will be able to perform all work duties without restriction or complaints of right shoulder pain. - MET     PLAN  []  Upgrade activities as tolerated     []  Continue plan of care  []  Update interventions per flow sheet       [x]  Discharge due to: Pt reaching all short and long-term therapy goals.    []  Other:_      Leonor Jung, PT, DPT 7/23/2020

## 2020-07-23 NOTE — ANCILLARY DISCHARGE INSTRUCTIONS
[facility] Physical Therapy  [address]  Phone: [phone]  Fax: [phone]    Discharge Summary  2-15    Patient name: Nadia Castanon  : 1995  Provider#: [id number]  Referral source: Rita Champagne NP      Medical/Treatment Diagnosis: Right shoulder pain [M25.511]     Prior Hospitalization: see medical history     Summary of Care: Ms. Sacha Ashton has been seen for 7 skilled physical therapy visits secondary to acute on chronic right shoulder pain and impingement. Pt has demonstrated significant improvements with regards to her glenohumeral AROM and pain. Medications: Verified on Patient Summary List    Start of Care: 20                                                               Onset Date: Acute on chronic (aggravated 2 weeks ago)            Visits from Start of Care: 7     Missed Visits: 0  Reporting Period : 20 to 20    ASSESSMENT/SUMMARY OF CARE: Ms. Sacha Ashton was seen for a total of 7 skilled phiysical therapy visits secondary to acute on chronic right shoulder pain and subacromial impingement syndrome. Pt demonstrated excellent progress with her therapy program and reports feeling 100% improved since evaluation. She now demonstrates full glenohumeral AROM, improved scapular control, and denies any shoulder pain with movement or activity. She has reached all short and long-term therapy goals and has been provided a comprehensive HEP in order to maintain benefits. Thank you for this referral!    (Eval to today)       R Shoulder ROM:      UYAZ                                            Flexion                         140 p! Lat shoulder  180 (no pain)                 Abduction                    90 p! Lat shoulder 180 (no pain)                  [id number]                    KXI p! At end-range  P!  At end-range WNL (no pain)              IR                                 Hand to T7 (T3)  T3 (no pain)                      JR                               XKXL to C7 (T4) T4 (no pain)           RECOMMENDATIONS:  [x]Discontinue therapy: [x]Patient has reached or is progressing toward set goals      []Patient is non-compliant or has abdicated      []Due to lack of appreciable progress towards set goals    Ale Landa, PT, DPT 7/23/2020

## 2020-09-11 ENCOUNTER — HOSPITAL ENCOUNTER (EMERGENCY)
Age: 25
Discharge: HOME | End: 2020-09-11
Attending: EMERGENCY MEDICINE
Payer: COMMERCIAL

## 2020-09-11 VITALS
SYSTOLIC BLOOD PRESSURE: 123 MMHG | RESPIRATION RATE: 18 BRPM | TEMPERATURE: 98.2 F | OXYGEN SATURATION: 98 % | HEIGHT: 67 IN | HEART RATE: 82 BPM | WEIGHT: 180 LBS | DIASTOLIC BLOOD PRESSURE: 83 MMHG | BODY MASS INDEX: 28.25 KG/M2

## 2020-09-11 DIAGNOSIS — R42 DIZZINESS: Primary | ICD-10-CM

## 2020-09-11 LAB
ALBUMIN SERPL-MCNC: 4.3 G/DL (ref 3.5–5)
ALBUMIN/GLOB SERPL: 1.1 {RATIO} (ref 1.1–2.2)
ALP SERPL-CCNC: 78 U/L (ref 45–117)
ALT SERPL-CCNC: 82 U/L (ref 12–78)
ANION GAP SERPL CALC-SCNC: 7 MMOL/L (ref 5–15)
APPEARANCE UR: CLEAR
AST SERPL-CCNC: 73 U/L (ref 15–37)
ATRIAL RATE: 74 BPM
BACTERIA URNS QL MICRO: NEGATIVE /HPF
BASOPHILS # BLD: 0.1 K/UL (ref 0–0.1)
BASOPHILS NFR BLD: 1 % (ref 0–1)
BILIRUB DIRECT SERPL-MCNC: <0.1 MG/DL (ref 0–0.2)
BILIRUB SERPL-MCNC: 0.4 MG/DL (ref 0.2–1)
BILIRUB UR QL: NEGATIVE
BUN SERPL-MCNC: 13 MG/DL (ref 6–20)
BUN/CREAT SERPL: 15 (ref 12–20)
CALCIUM SERPL-MCNC: 9.6 MG/DL (ref 8.5–10.1)
CALCULATED P AXIS, ECG09: 78 DEGREES
CALCULATED R AXIS, ECG10: 79 DEGREES
CALCULATED T AXIS, ECG11: 60 DEGREES
CHLORIDE SERPL-SCNC: 103 MMOL/L (ref 97–108)
CO2 SERPL-SCNC: 27 MMOL/L (ref 21–32)
COLOR UR: ABNORMAL
COMMENT, HOLDF: NORMAL
CREAT SERPL-MCNC: 0.85 MG/DL (ref 0.55–1.02)
DIAGNOSIS, 93000: NORMAL
DIFFERENTIAL METHOD BLD: ABNORMAL
EOSINOPHIL # BLD: 0.3 K/UL (ref 0–0.4)
EOSINOPHIL NFR BLD: 4 % (ref 0–7)
EPITH CASTS URNS QL MICRO: ABNORMAL /LPF
ERYTHROCYTE [DISTWIDTH] IN BLOOD BY AUTOMATED COUNT: 12.6 % (ref 11.5–14.5)
GLOBULIN SER CALC-MCNC: 3.9 G/DL (ref 2–4)
GLUCOSE SERPL-MCNC: 92 MG/DL (ref 65–100)
GLUCOSE UR STRIP.AUTO-MCNC: NEGATIVE MG/DL
HCG UR QL: NEGATIVE
HCT VFR BLD AUTO: 42.7 % (ref 35–47)
HGB BLD-MCNC: 14.4 G/DL (ref 11.5–16)
HGB UR QL STRIP: ABNORMAL
HYALINE CASTS URNS QL MICRO: ABNORMAL /LPF (ref 0–5)
IMM GRANULOCYTES # BLD AUTO: 0.2 K/UL (ref 0–0.04)
IMM GRANULOCYTES NFR BLD AUTO: 2 % (ref 0–0.5)
KETONES UR QL STRIP.AUTO: NEGATIVE MG/DL
LEUKOCYTE ESTERASE UR QL STRIP.AUTO: NEGATIVE
LYMPHOCYTES # BLD: 2 K/UL (ref 0.8–3.5)
LYMPHOCYTES NFR BLD: 23 % (ref 12–49)
MCH RBC QN AUTO: 31.4 PG (ref 26–34)
MCHC RBC AUTO-ENTMCNC: 33.7 G/DL (ref 30–36.5)
MCV RBC AUTO: 93 FL (ref 80–99)
MONOCYTES # BLD: 0.9 K/UL (ref 0–1)
MONOCYTES NFR BLD: 10 % (ref 5–13)
NEUTS SEG # BLD: 5.1 K/UL (ref 1.8–8)
NEUTS SEG NFR BLD: 60 % (ref 32–75)
NITRITE UR QL STRIP.AUTO: NEGATIVE
NRBC # BLD: 0 K/UL (ref 0–0.01)
NRBC BLD-RTO: 0 PER 100 WBC
P-R INTERVAL, ECG05: 152 MS
PH UR STRIP: 5.5 [PH] (ref 5–8)
PLATELET # BLD AUTO: 289 K/UL (ref 150–400)
PMV BLD AUTO: 9.6 FL (ref 8.9–12.9)
POTASSIUM SERPL-SCNC: 3.8 MMOL/L (ref 3.5–5.1)
PROT SERPL-MCNC: 8.2 G/DL (ref 6.4–8.2)
PROT UR STRIP-MCNC: NEGATIVE MG/DL
Q-T INTERVAL, ECG07: 394 MS
QRS DURATION, ECG06: 80 MS
QTC CALCULATION (BEZET), ECG08: 437 MS
RBC # BLD AUTO: 4.59 M/UL (ref 3.8–5.2)
RBC #/AREA URNS HPF: ABNORMAL /HPF (ref 0–5)
RBC MORPH BLD: ABNORMAL
SAMPLES BEING HELD,HOLD: NORMAL
SODIUM SERPL-SCNC: 137 MMOL/L (ref 136–145)
SP GR UR REFRACTOMETRY: 1.01 (ref 1–1.03)
TROPONIN I SERPL-MCNC: <0.05 NG/ML
UR CULT HOLD, URHOLD: NORMAL
UROBILINOGEN UR QL STRIP.AUTO: 0.2 EU/DL (ref 0.2–1)
VENTRICULAR RATE, ECG03: 74 BPM
WBC # BLD AUTO: 8.6 K/UL (ref 3.6–11)
WBC URNS QL MICRO: ABNORMAL /HPF (ref 0–4)

## 2020-09-11 PROCEDURE — 80048 BASIC METABOLIC PNL TOTAL CA: CPT

## 2020-09-11 PROCEDURE — 80076 HEPATIC FUNCTION PANEL: CPT

## 2020-09-11 PROCEDURE — 99284 EMERGENCY DEPT VISIT MOD MDM: CPT

## 2020-09-11 PROCEDURE — 84484 ASSAY OF TROPONIN QUANT: CPT

## 2020-09-11 PROCEDURE — 85025 COMPLETE CBC W/AUTO DIFF WBC: CPT

## 2020-09-11 PROCEDURE — 96360 HYDRATION IV INFUSION INIT: CPT

## 2020-09-11 PROCEDURE — 74011250636 HC RX REV CODE- 250/636: Performed by: EMERGENCY MEDICINE

## 2020-09-11 PROCEDURE — 81001 URINALYSIS AUTO W/SCOPE: CPT

## 2020-09-11 PROCEDURE — 93005 ELECTROCARDIOGRAM TRACING: CPT

## 2020-09-11 PROCEDURE — 81025 URINE PREGNANCY TEST: CPT

## 2020-09-11 RX ORDER — MECLIZINE HCL 12.5 MG 12.5 MG/1
25 TABLET ORAL
Status: COMPLETED | OUTPATIENT
Start: 2020-09-11 | End: 2020-09-11

## 2020-09-11 RX ORDER — MECLIZINE HCL 12.5 MG 12.5 MG/1
12.5 TABLET ORAL
Qty: 30 TAB | Refills: 0 | Status: SHIPPED | OUTPATIENT
Start: 2020-09-11 | End: 2020-09-21

## 2020-09-11 RX ORDER — LEVONORGESTREL 19.5 MG/1
INTRAUTERINE DEVICE INTRAUTERINE
COMMUNITY

## 2020-09-11 RX ADMIN — SODIUM CHLORIDE 1000 ML: 9 INJECTION, SOLUTION INTRAVENOUS at 15:59

## 2020-09-11 RX ADMIN — MECLIZINE 25 MG: 12.5 TABLET ORAL at 15:58

## 2020-09-11 NOTE — ED TRIAGE NOTES
Arrives ambulatory for sudden onset of nausea, hot flash, \"heart rate lower than normal,\" dizziness, and feeling of syncope since 1045. \"I feel tingly every where. \"     \"no one can seem to figure out what is going on.  This has happened to me before\"

## 2020-09-11 NOTE — ED PROVIDER NOTES
Patient is a 69-year-old female history of asthma, depression, headache presents emergency department for evaluation of episode of feeling warm, nauseous, dizzy, and feet which occurred around 1045 while standing at work. 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 She reports that these episodes have been happening for several years, she has had recent evaluations with her primary care physician, and has not been diagnosed with anything to date. Reports that today's episode is different because she has had residual dizziness since the initial episode. Dizziness is described as a lightheadedness additional feelings of rotation. She denies headache, fever, chills, sweats, diplopia, blurry vision, sore throat, chest pain, shortness of breath, abdominal pain, vomiting, diarrhea, dysuria, leg swelling, difficulty walking, or any other medical complaints at this time. Past Medical History:   Diagnosis Date    Asthma     Depression     Headache        Past Surgical History:   Procedure Laterality Date    KNEE ARTHROSCP HARV           Family History:   Problem Relation Age of Onset    Migraines Mother     Hypertension Maternal Grandmother     Thyroid Disease Maternal Grandmother        Social History     Socioeconomic History    Marital status: SINGLE     Spouse name: Not on file    Number of children: Not on file    Years of education: Not on file    Highest education level: Not on file   Occupational History    Not on file   Social Needs    Financial resource strain: Not on file    Food insecurity     Worry: Not on file     Inability: Not on file    Transportation needs     Medical: Not on file     Non-medical: Not on file   Tobacco Use    Smoking status: Never Smoker    Smokeless tobacco: Never Used   Substance and Sexual Activity    Alcohol use: No    Drug use: No    Sexual activity: Yes     Birth control/protection: I.U.D.    Lifestyle    Physical activity     Days per week: Not on file     Minutes per session: Not on file    Stress: Not on file   Relationships    Social connections     Talks on phone: Not on file     Gets together: Not on file     Attends Hindu service: Not on file     Active member of club or organization: Not on file     Attends meetings of clubs or organizations: Not on file     Relationship status: Not on file    Intimate partner violence     Fear of current or ex partner: Not on file     Emotionally abused: Not on file     Physically abused: Not on file     Forced sexual activity: Not on file   Other Topics Concern    Not on file   Social History Narrative    24year old single  female admitted voluntarily for CC: of SI after a breakup with a boyfriend. Pt. Was dx'd with depression by her pcp and started on Lexapro which was just changed to Effexor for non improvement of depression. She cut her arm priior to admission while feeling suicidal,. She has no prior inpatient psychiatric admissions and denies past suicide attempts. 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 Her parents are paying her tuition and she ford not work. She has no extracurrixular activities because she has no time after going to the gym 3 times a week. ALLERGIES: Patient has no known allergies. Review of Systems   Constitutional: Negative for chills and fever. HENT: Negative for ear pain and sore throat. Eyes: Negative for visual disturbance. Respiratory: Negative for cough and shortness of breath. Cardiovascular: Negative for chest pain. Gastrointestinal: Positive for nausea. Negative for abdominal pain, diarrhea, rectal pain and vomiting. Genitourinary: Negative for flank pain. Musculoskeletal: Negative for back pain. Skin: Negative for color change. Neurological: Positive for dizziness. Negative for headaches. Psychiatric/Behavioral: Negative for confusion. Vitals:    09/11/20 1342   BP: (!) 147/82   Pulse: 82   Resp: 20   Temp: 97 °F (36.1 °C)   SpO2: 100%   Weight: 81.6 kg (180 lb)   Height: 5' 7\" (1.702 m)            Physical Exam  Vitals signs and nursing note reviewed. Constitutional:       General: She is not in acute distress. Appearance: Normal appearance. She is not ill-appearing. HENT:      Head: Normocephalic and atraumatic. Mouth/Throat:      Pharynx: Oropharynx is clear. Eyes:      General: No visual field deficit. Extraocular Movements: Extraocular movements intact. Conjunctiva/sclera: Conjunctivae normal.   Neck:      Musculoskeletal: No neck rigidity. Cardiovascular:      Rate and Rhythm: Normal rate and regular rhythm. Pulmonary:      Effort: Pulmonary effort is normal. No respiratory distress. Breath sounds: Normal breath sounds. No wheezing. Abdominal:      General: There is no distension. Palpations: Abdomen is soft. Tenderness: There is no abdominal tenderness. There is no guarding. Musculoskeletal: Normal range of motion. Right lower leg: No edema. Left lower leg: No edema. Skin:     General: Skin is warm and dry. Neurological:      General: No focal deficit present. Mental Status: She is alert and oriented to person, place, and time. GCS: GCS eye subscore is 4. GCS verbal subscore is 5. GCS motor subscore is 6. Cranial Nerves: Cranial nerves are intact. No cranial nerve deficit, dysarthria or facial asymmetry. Sensory: Sensation is intact. Motor: Motor function is intact. No weakness or pronator drift. Coordination: Coordination is intact. Finger-Nose-Finger Test and Heel to [place] malu Test normal.      Gait: Gait is intact. Gait normal.   Psychiatric:         Mood and Affect: Mood normal.          MDM  Number of Diagnoses or Management Options  Diagnosis management comments: Patient is alert, appearing, afebrile, vitals stable.   Episode of dizziness and near syncope while standing at work today, which is been an ongoing problem for her. Lungs clear to auscultation bilaterally. Abdomen is soft, nondistended, nontender. She is alert and oriented x4. No focal neurologic deficits on exam.  I personally ambulated patient to the ED, no signs of ataxia or lower extremity weakness. EKG is without acute ischemic changes or ectopy. Troponin x1 negative. Patient is low risk heart category. Remainder of ED work-up is within normal limits. Patient given 1 L IV fluids and meclizine and her symptoms have resolved. Sign symptoms likely secondary to orthostatic hypotension versus vasovagal near syncope versus vertigo. Patient to be discharged home with prescription for meclizine and follow-up with her PCP. Strict return precautions given. All questions answered at this time. Amount and/or Complexity of Data Reviewed  Clinical lab tests: reviewed  Tests in the radiology section of CPT®: reviewed  Tests in the medicine section of CPT®: reviewed      ED Course as of Sep 11 1643   Fri Sep 11, 2020   1456 ED EKG interpretation:2:56 PM  Rhythm: normal sinus rhythm; and regular. Rate (approx.): 74; Axis: normal; P wave: normal; ST/T wave: no concerning ST elevations or depressions; Other findings: unremarkable. JULIETA Brown        [EH]   1755 METABOLIC PANEL, BASIC:    Sodium 137   Potassium 3.8   Chloride 103   CO2 27   Anion gap 7   Glucose 92   BUN 13   Creatinine 0.85   BUN/Creatinine ratio 15   GFR est AA >60   GFR est non-AA >60   Calcium 9.6 [EH]   1542 CBC WITH AUTOMATED DIFF:    WBC 8.6   RBC 4.59   HGB 14.4   HCT 42.7   MCV 93.0   MCH 31.4   MCHC 33.7   RDW 12.6   PLATELET 302   MPV 9.6   NRBC 0.0   ABSOLUTE NRBC 0.00   NEUTROPHILS PENDING   LYMPHOCYTES PENDING   MONOCYTES PENDING   EOSINOPHILS PENDING   BASOPHILS PENDING   IMMATURE GRANULOCYTES PENDING   ABS. NEUTROPHILS PENDING   ABS. LYMPHOCYTES PENDING   ABS. MONOCYTES PENDING   ABS.  EOSINOPHILS PENDING   ABS. BASOPHILS PENDING   ABS. IMM. GRANS. PENDING   DF PENDING [EH]   1542 TROPONIN I:    Troponin-I, Qt. <0.05 [EH]   1542 HCG URINE, QL. - POC:    Pregnancy test,urine (POC) Negative [EH]   1601 HEPATIC FUNCTION PANEL(!):    Protein, total 8.2   Albumin 4.3   Globulin 3.9   A-G Ratio 1.1   Bilirubin, total 0.4   Bilirubin, direct <0.1   Alk. phosphatase 78   AST 73(!)   ALT 82(!) [EH]      ED Course User Index  [EH] JULIETA Rod     4:47 PM  Pt has been reevaluated. There are no new complaints, changes, or physical findings at this time. Medications have been reviewed w/ pt and/or family. Pt and/or family's questions have been answered. Pt and/or family expressed good understanding of the dx/tx/rx and is in agreement with plan of care. Pt instructed and agreed to f/u w/ PCP and to return to ED upon further deterioration. Pt is ready for discharge. IMPRESSION:  1. Dizziness        PLAN:  1. Current Discharge Medication List      START taking these medications    Details   meclizine (ANTIVERT) 12.5 mg tablet Take 1 Tab by mouth three (3) times daily as needed for Dizziness for up to 10 days. Indications: sensation of spinning or whirling  Qty: 30 Tab, Refills: 0           2.    Follow-up Information     Follow up With Specialties Details Why Contact Tavon Rucker NP Nurse Practitioner Go in 3 days As needed [place] [address] [phone]              Return to ED if worse     Procedures

## 2020-09-11 NOTE — ED NOTES
Pt provided with discharge instructions. Pt verbalized understanding. IV removed. Pt ambulatory out of ED with VSS and no signs of distress.

## 2022-02-25 ENCOUNTER — PATIENT MESSAGE (OUTPATIENT)
Dept: PHARMACY | Age: 27
End: 2022-02-25

## 2022-02-25 ENCOUNTER — DOCUMENTATION ONLY (OUTPATIENT)
Dept: PHARMACY | Age: 27
End: 2022-02-25

## 2022-02-25 NOTE — LETTER
[facility]  [address], [place] [address]  Phone: toll free [phone] Option #3        Ms. Jacob Talavera [phone]          Thanks so much for taking the first step towards better health.       This message is to inform you that we have received your enrollment form for the [facility] Be Well With Diabetes Program, but you are missing the following requirements or documentation:       Documentation of [address] Coverage - to participate in this program you must be either an employee of [facility] or their spouse and have [facility]   2.   Diagnosis of Diabetes Type One or Two - as per documentation from your Provider   3.   Provider Visit for DM within the last 12 months   4.   A1C Result within the last 12 months   5.   Lipid Panel drawn yearly within the last 12 months   6..  Urine Albumin test yearly within the last 12 months       To qualify for this program the above requirements must be met by 3/25/21 for enrollment into the program on 4/01/22.  Results or visits obtained outside of [facility] will need to be provided by fax: [phone] or email: [email or web address] before the deadline in order to qualify for the program.       If requirements are not met by the date listed above, you will be not be enrolled in the program.     [facility]   Phone: toll free [phone] Option #3   Email: [email or web address]   Fax Number: [phone]

## 2022-02-25 NOTE — PROGRESS NOTES
Pharmacy Pop Care Documentation:   Patient is missing the following requirements: [facility] Benefits; DX: DM Type One or Type Two;  Provider Documentation of DM Visit; A1C; Lipid Panel; Urine Albumin. If completed by 2/25/22 patient will be eligible for enrollment in the DM Program on 3/01/22. Application Received: via [employer] message and e-mail sent to patient.     Martin Greene, [address]   Department, toll free: [phone] Option #3

## 2022-03-11 NOTE — TELEPHONE ENCOUNTER
[employer] message not read by patient. E-mail was also sent on 2/25/22. Letter mailed to patient with updated DM Program April enrollment information.

## 2023-04-03 ENCOUNTER — OFFICE VISIT (OUTPATIENT)
Dept: ORTHOPEDIC SURGERY | Age: 28
End: 2023-04-03

## 2023-04-03 DIAGNOSIS — M54.50 CHRONIC BILATERAL LOW BACK PAIN, UNSPECIFIED WHETHER SCIATICA PRESENT: Primary | ICD-10-CM

## 2023-04-03 DIAGNOSIS — G89.29 CHRONIC BILATERAL LOW BACK PAIN, UNSPECIFIED WHETHER SCIATICA PRESENT: Primary | ICD-10-CM

## 2023-04-03 DIAGNOSIS — M47.816 ARTHRITIS OF FACET JOINT OF LUMBAR SPINE: ICD-10-CM

## 2023-04-03 DIAGNOSIS — M47.816 LUMBAR SPONDYLOSIS: ICD-10-CM

## 2023-04-03 RX ORDER — MELOXICAM 15 MG/1
15 TABLET ORAL DAILY
Qty: 14 TABLET | Refills: 2 | Status: SHIPPED
Start: 2023-04-03

## 2023-04-03 NOTE — PROGRESS NOTES
Maximus Danielle (: 1995) is a 33 y.o. female here for evaluation of the following chief complaint(s):  Back Pain ([facility] ordered PT.)       ASSESSMENT/PLAN:  Below is the assessment and plan developed based on review of pertinent history, physical exam, labs, studies, and medications. 1. Chronic bilateral low back pain, unspecified whether sciatica present  -     XR SPINE LUMBAR BEND/FLEXION EXTENSION; Future  -     MRI LUMB SPINE WO CONT; Future  2. Arthritis of facet joint of lumbar spine  3. Lumbar spondylosis      Return in about 2 weeks (around 2023) for MRI review. I will also add Meloxicam 15mg qD for 2 wks. Red flag symptoms discussed with the patient. Patient is to present to the emergency department if any of these symptoms occur. Patient verbalized understanding and agrees to proceed with the aforementioned plan. Thank you for allowing me to participate in the care of this patient. SUBJECTIVE/OBJECTIVE:    HPI    Patient is a pleasant 33 y.o. F who presents with atraumatic chronic mechanical back pain. Symptoms have been slowly progressive over the past several years but worse over the past several months. It is worse with bending/twisting/lifting through the spine and prolonged sitting and better with rest.    Patient ambulates without assist.  Normal ambulatory capacity. No gait instability. No fall history. No upper extremity dexterity issues. No bowel/bladder control issues. No other red flag symptoms. Therapies (Rx/PT/Injections): PT with no relief and NSAIDs with no relief      Chief Complaint   Patient presents with    Back Pain     [facility] ordered PT. Current Outpatient Medications   Medication Sig    meloxicam (Mobic) 15 mg tablet Take 1 Tablet by mouth daily. levonorgestreL (Kyleena) 17.5 mcg/24 hrs (5 yrs) 19.5 mg IUD IUD by IntraUTERine route.     valACYclovir (VALTREX) 500 mg tablet     fluticasone propionate (FLONASE) 50 mcg/actuation nasal spray 2 Sprays by Both Nostrils route daily. PROAIR HFA 90 mcg/actuation inhaler Take 2 Puffs by inhalation every four (4) hours as needed. No current facility-administered medications for this visit.        Past Medical History:   Diagnosis Date    Asthma     Depression     Headache      Past Surgical History:   Procedure Laterality Date    KNEE ARTHROSCP HARV       Family History   Problem Relation Age of Onset    Migraines Mother     Hypertension Maternal Grandmother     Thyroid Disease Maternal Grandmother      Social History     Tobacco Use    Smoking status: Never    Smokeless tobacco: Never   Substance Use Topics    Alcohol use: No    Drug use: No      Social History     Tobacco Use   Smoking Status Never   Smokeless Tobacco Never     Social History     Substance and Sexual Activity   Alcohol Use No       Review of Systems  Red flag symptoms: None  Bowel/Bladder/Saddle Anesthesia: Denies  Weakness/Sensory Disturbance: Denies  Ambulation/Falls: Denies  Constitutional Symptoms (F/C/NS/WL): Denies    Ht 5' 6\" (1.676 m)   Wt 145 lb (65.8 kg)   BMI 23.40 kg/m²      Physical Exam    GENERAL:  AAOx3, appears stated age, no distress  Body habitus: Normal    LOWER EXTREMITIES:  Gait: Intact heel and toe gait, intact tandem gait   Motor: 5/5 in all myotomes L3-S1 bilaterally  Sensory: Intact to light touch in all dermatomes L4-S1 bilaterally  Reflexes: Normal L4 and S1 bilaterally  Pathological reflexes: No sustained clonus, down going Babinski bilaterally   Special tests: Negative seated SLR BL    NECK/BACK:  Integumentary: Normal  Palpation/ROM: Paraspinal tenderness to palpation; decreased range of motion in all planes, primarily flexion/extension/rotation, secondary to pain and stiffness      IMAGING:    XR Results (most recent):  Results from Appointment encounter on 04/03/23    XR SPINE LUMBAR BEND/FLEXION EXTENSION    Narrative  2 view, flexion and extension, lumbar spine demonstrating mild posterior facet arthrosis from L4-S1. Minimal disc degeneration present. Minimal neuroforaminal stenosis present. No instability on dynamic films. An electronic signature was used to authenticate this note.   -- Alta Martínez DO

## 2023-05-25 ENCOUNTER — TRANSCRIBE ORDERS (OUTPATIENT)
Facility: HOSPITAL | Age: 28
End: 2023-05-25

## 2023-05-25 DIAGNOSIS — M54.50 CHRONIC BILATERAL LOW BACK PAIN, UNSPECIFIED WHETHER SCIATICA PRESENT: Primary | ICD-10-CM

## 2023-05-25 DIAGNOSIS — G89.29 CHRONIC BILATERAL LOW BACK PAIN, UNSPECIFIED WHETHER SCIATICA PRESENT: Primary | ICD-10-CM

## 2023-06-02 ENCOUNTER — HOSPITAL ENCOUNTER (OUTPATIENT)
Age: 28
Discharge: HOME | End: 2023-06-02
Payer: COMMERCIAL

## 2023-06-02 DIAGNOSIS — G89.29 CHRONIC BILATERAL LOW BACK PAIN, UNSPECIFIED WHETHER SCIATICA PRESENT: ICD-10-CM

## 2023-06-02 DIAGNOSIS — M54.50 CHRONIC BILATERAL LOW BACK PAIN, UNSPECIFIED WHETHER SCIATICA PRESENT: ICD-10-CM

## 2023-06-02 PROCEDURE — 72148 MRI LUMBAR SPINE W/O DYE: CPT

## 2023-07-06 LAB
CHOLEST SERPL-MCNC: 177 MG/DL
GLUCOSE SERPL-MCNC: 89 MG/DL (ref 65–100)
HDLC SERPL-MCNC: 79 MG/DL
LDLC SERPL CALC-MCNC: 87.2 MG/DL (ref 0–100)
TRIGL SERPL-MCNC: 54 MG/DL

## 2024-01-18 ENCOUNTER — CLINICAL DOCUMENTATION (OUTPATIENT)
Dept: PHARMACY | Facility: CLINIC | Age: 29
End: 2024-01-18

## 2024-01-18 NOTE — PROGRESS NOTES
Patient listed as Mary Grace Arnett in [employer].    **Patient is [facility]**  Pharmacy Pop Care Documentation:   Patient is missing the following requirements: DX: DM Type One or Type Two;  Provider Documentation of DM Visit; A1C.  If completed by 1/25/24 patient will be eligible for enrollment in the DM Program on 2/01/24.    Application Received: via [employer].    Letter mailed to patient.    Zainab Jacome CphT  [facility]  Clinical Pharmacy   Department, toll free: [phone] Option #3    For Pharmacy Admin Tracking Only    Program: Pop Health  CPA in place:  No  Gap Closed?: No   Time Spent (min): 5

## 2024-01-18 NOTE — PROGRESS NOTES
Patient previously applied to the DM Program:  Zainab Jacome - 02/25/2022 7:25 AM EST    Pharmacy Pop Care Documentation:   Patient is missing the following requirements: [facility] Benefits; DX: DM Type One or Type Two; Provider Documentation of DM Visit; A1C; Lipid Panel; Urine Albumin.  If completed by 2/25/22 patient will be eligible for enrollment in the DM Program on 3/01/22.    Application Received: via [employer].